# Patient Record
Sex: FEMALE
[De-identification: names, ages, dates, MRNs, and addresses within clinical notes are randomized per-mention and may not be internally consistent; named-entity substitution may affect disease eponyms.]

---

## 2021-02-04 ENCOUNTER — RESULT REVIEW (OUTPATIENT)
Age: 66
End: 2021-02-04

## 2021-02-05 ENCOUNTER — APPOINTMENT (OUTPATIENT)
Dept: OBGYN | Facility: CLINIC | Age: 66
End: 2021-02-05
Payer: COMMERCIAL

## 2021-02-05 PROCEDURE — 99072 ADDL SUPL MATRL&STAF TM PHE: CPT

## 2021-02-05 PROCEDURE — 99386 PREV VISIT NEW AGE 40-64: CPT

## 2021-10-21 PROBLEM — Z00.00 ENCOUNTER FOR PREVENTIVE HEALTH EXAMINATION: Status: ACTIVE | Noted: 2021-10-21

## 2022-10-24 ENCOUNTER — RESULT REVIEW (OUTPATIENT)
Age: 67
End: 2022-10-24

## 2022-10-31 ENCOUNTER — INPATIENT (INPATIENT)
Facility: HOSPITAL | Age: 67
LOS: 2 days | Discharge: HOME | DRG: 287 | End: 2022-11-03
Attending: INTERNAL MEDICINE | Admitting: INTERNAL MEDICINE
Payer: MEDICARE

## 2022-10-31 VITALS
TEMPERATURE: 98 F | RESPIRATION RATE: 20 BRPM | OXYGEN SATURATION: 100 % | WEIGHT: 117.07 LBS | HEIGHT: 63 IN | DIASTOLIC BLOOD PRESSURE: 68 MMHG | SYSTOLIC BLOOD PRESSURE: 108 MMHG | HEART RATE: 83 BPM

## 2022-10-31 DIAGNOSIS — R07.9 CHEST PAIN, UNSPECIFIED: ICD-10-CM

## 2022-10-31 LAB
ALBUMIN SERPL ELPH-MCNC: 3.7 G/DL — SIGNIFICANT CHANGE UP (ref 3.3–5)
ALBUMIN SERPL ELPH-MCNC: 4.2 G/DL — SIGNIFICANT CHANGE UP (ref 3.3–5)
ALP SERPL-CCNC: 65 U/L — SIGNIFICANT CHANGE UP (ref 40–120)
ALP SERPL-CCNC: 75 U/L — SIGNIFICANT CHANGE UP (ref 40–120)
ALT FLD-CCNC: 27 U/L — SIGNIFICANT CHANGE UP (ref 10–45)
ALT FLD-CCNC: 34 U/L — SIGNIFICANT CHANGE UP (ref 10–45)
ANION GAP SERPL CALC-SCNC: 11 MMOL/L — SIGNIFICANT CHANGE UP (ref 5–17)
APTT BLD: 30.1 SEC — SIGNIFICANT CHANGE UP (ref 27.5–35.5)
APTT BLD: 39.6 SEC — HIGH (ref 27.5–35.5)
AST SERPL-CCNC: 28 U/L — SIGNIFICANT CHANGE UP (ref 10–40)
AST SERPL-CCNC: 35 U/L — SIGNIFICANT CHANGE UP (ref 10–40)
BASE EXCESS BLDV CALC-SCNC: 3 MMOL/L — SIGNIFICANT CHANGE UP (ref -2–3)
BASOPHILS # BLD AUTO: 0.04 K/UL — SIGNIFICANT CHANGE UP (ref 0–0.2)
BASOPHILS NFR BLD AUTO: 0.5 % — SIGNIFICANT CHANGE UP (ref 0–2)
BILIRUB SERPL-MCNC: 0.3 MG/DL — SIGNIFICANT CHANGE UP (ref 0.2–1.2)
BILIRUB SERPL-MCNC: 0.4 MG/DL — SIGNIFICANT CHANGE UP (ref 0.2–1.2)
BUN SERPL-MCNC: 9 MG/DL — SIGNIFICANT CHANGE UP (ref 7–23)
CA-I SERPL-SCNC: 1.21 MMOL/L — SIGNIFICANT CHANGE UP (ref 1.15–1.33)
CALCIUM SERPL-MCNC: 8.8 MG/DL — SIGNIFICANT CHANGE UP (ref 8.4–10.5)
CALCIUM SERPL-MCNC: 9.2 MG/DL — SIGNIFICANT CHANGE UP (ref 8.4–10.5)
CHLORIDE BLDV-SCNC: 101 MMOL/L — SIGNIFICANT CHANGE UP (ref 96–108)
CHLORIDE SERPL-SCNC: 100 MMOL/L — SIGNIFICANT CHANGE UP (ref 96–108)
CHLORIDE SERPL-SCNC: 101 MMOL/L — SIGNIFICANT CHANGE UP (ref 96–108)
CO2 BLDV-SCNC: 31 MMOL/L — HIGH (ref 22–26)
CO2 SERPL-SCNC: 25 MMOL/L — SIGNIFICANT CHANGE UP (ref 22–31)
CO2 SERPL-SCNC: 26 MMOL/L — SIGNIFICANT CHANGE UP (ref 22–31)
CREAT SERPL-MCNC: 0.52 MG/DL — SIGNIFICANT CHANGE UP (ref 0.5–1.3)
CREAT SERPL-MCNC: 0.58 MG/DL — SIGNIFICANT CHANGE UP (ref 0.5–1.3)
CRP SERPL-MCNC: 168 MG/L — HIGH (ref 0–4)
EGFR: 100 ML/MIN/1.73M2 — SIGNIFICANT CHANGE UP
EGFR: 103 ML/MIN/1.73M2 — SIGNIFICANT CHANGE UP
EOSINOPHIL # BLD AUTO: 0.03 K/UL — SIGNIFICANT CHANGE UP (ref 0–0.5)
EOSINOPHIL NFR BLD AUTO: 0.4 % — SIGNIFICANT CHANGE UP (ref 0–6)
FLUAV AG NPH QL: SIGNIFICANT CHANGE UP
FLUBV AG NPH QL: SIGNIFICANT CHANGE UP
GAS PNL BLDV: 133 MMOL/L — LOW (ref 136–145)
GAS PNL BLDV: SIGNIFICANT CHANGE UP
GLUCOSE BLDV-MCNC: 93 MG/DL — SIGNIFICANT CHANGE UP (ref 70–99)
GLUCOSE SERPL-MCNC: 112 MG/DL — HIGH (ref 70–99)
GLUCOSE SERPL-MCNC: 97 MG/DL — SIGNIFICANT CHANGE UP (ref 70–99)
HCO3 BLDV-SCNC: 29 MMOL/L — SIGNIFICANT CHANGE UP (ref 22–29)
HCT VFR BLD CALC: 33 % — LOW (ref 34.5–45)
HCT VFR BLD CALC: 35.4 % — SIGNIFICANT CHANGE UP (ref 34.5–45)
HCT VFR BLDA CALC: 37 % — SIGNIFICANT CHANGE UP (ref 34.5–46.5)
HGB BLD CALC-MCNC: 12.3 G/DL — SIGNIFICANT CHANGE UP (ref 11.7–16.1)
HGB BLD-MCNC: 10.9 G/DL — LOW (ref 11.5–15.5)
HGB BLD-MCNC: 11.5 G/DL — SIGNIFICANT CHANGE UP (ref 11.5–15.5)
IMM GRANULOCYTES NFR BLD AUTO: 0.4 % — SIGNIFICANT CHANGE UP (ref 0–0.9)
INR BLD: 1.17 RATIO — HIGH (ref 0.88–1.16)
LACTATE BLDV-MCNC: 1.1 MMOL/L — SIGNIFICANT CHANGE UP (ref 0.5–2)
LYMPHOCYTES # BLD AUTO: 1.33 K/UL — SIGNIFICANT CHANGE UP (ref 1–3.3)
LYMPHOCYTES # BLD AUTO: 16.1 % — SIGNIFICANT CHANGE UP (ref 13–44)
MAGNESIUM SERPL-MCNC: 2.1 MG/DL — SIGNIFICANT CHANGE UP (ref 1.6–2.6)
MCHC RBC-ENTMCNC: 30 PG — SIGNIFICANT CHANGE UP (ref 27–34)
MCHC RBC-ENTMCNC: 30.4 PG — SIGNIFICANT CHANGE UP (ref 27–34)
MCHC RBC-ENTMCNC: 32.5 GM/DL — SIGNIFICANT CHANGE UP (ref 32–36)
MCHC RBC-ENTMCNC: 33 GM/DL — SIGNIFICANT CHANGE UP (ref 32–36)
MCV RBC AUTO: 91.9 FL — SIGNIFICANT CHANGE UP (ref 80–100)
MCV RBC AUTO: 92.4 FL — SIGNIFICANT CHANGE UP (ref 80–100)
MONOCYTES # BLD AUTO: 0.73 K/UL — SIGNIFICANT CHANGE UP (ref 0–0.9)
MONOCYTES NFR BLD AUTO: 8.9 % — SIGNIFICANT CHANGE UP (ref 2–14)
NEUTROPHILS # BLD AUTO: 6.08 K/UL — SIGNIFICANT CHANGE UP (ref 1.8–7.4)
NEUTROPHILS NFR BLD AUTO: 73.7 % — SIGNIFICANT CHANGE UP (ref 43–77)
NRBC # BLD: 0 /100 WBCS — SIGNIFICANT CHANGE UP (ref 0–0)
PCO2 BLDV: 52 MMHG — HIGH (ref 39–42)
PH BLDV: 7.36 — SIGNIFICANT CHANGE UP (ref 7.32–7.43)
PLATELET # BLD AUTO: 184 K/UL — SIGNIFICANT CHANGE UP (ref 150–400)
PLATELET # BLD AUTO: 191 K/UL — SIGNIFICANT CHANGE UP (ref 150–400)
PO2 BLDV: 27 MMHG — SIGNIFICANT CHANGE UP (ref 25–45)
POTASSIUM BLDV-SCNC: 3.5 MMOL/L — SIGNIFICANT CHANGE UP (ref 3.5–5.1)
POTASSIUM SERPL-MCNC: 3.6 MMOL/L — SIGNIFICANT CHANGE UP (ref 3.5–5.3)
POTASSIUM SERPL-SCNC: 3.6 MMOL/L — SIGNIFICANT CHANGE UP (ref 3.5–5.3)
PROT SERPL-MCNC: 6.3 G/DL — SIGNIFICANT CHANGE UP (ref 6–8.3)
PROT SERPL-MCNC: 7.1 G/DL — SIGNIFICANT CHANGE UP (ref 6–8.3)
PROTHROM AB SERPL-ACNC: 13.6 SEC — HIGH (ref 10.5–13.4)
RBC # BLD: 3.59 M/UL — LOW (ref 3.8–5.2)
RBC # BLD: 3.83 M/UL — SIGNIFICANT CHANGE UP (ref 3.8–5.2)
RBC # FLD: 11.9 % — SIGNIFICANT CHANGE UP (ref 10.3–14.5)
RBC # FLD: 12 % — SIGNIFICANT CHANGE UP (ref 10.3–14.5)
RSV RNA NPH QL NAA+NON-PROBE: SIGNIFICANT CHANGE UP
SAO2 % BLDV: 40 % — LOW (ref 67–88)
SARS-COV-2 RNA SPEC QL NAA+PROBE: SIGNIFICANT CHANGE UP
SODIUM SERPL-SCNC: 137 MMOL/L — SIGNIFICANT CHANGE UP (ref 135–145)
TROPONIN T, HIGH SENSITIVITY RESULT: 141 NG/L — HIGH (ref 0–51)
TROPONIN T, HIGH SENSITIVITY RESULT: 159 NG/L — HIGH (ref 0–51)
WBC # BLD: 6.02 K/UL — SIGNIFICANT CHANGE UP (ref 3.8–10.5)
WBC # BLD: 8.24 K/UL — SIGNIFICANT CHANGE UP (ref 3.8–10.5)
WBC # FLD AUTO: 6.02 K/UL — SIGNIFICANT CHANGE UP (ref 3.8–10.5)
WBC # FLD AUTO: 8.24 K/UL — SIGNIFICANT CHANGE UP (ref 3.8–10.5)

## 2022-10-31 PROCEDURE — 93308 TTE F-UP OR LMTD: CPT | Mod: 26

## 2022-10-31 PROCEDURE — 99291 CRITICAL CARE FIRST HOUR: CPT

## 2022-10-31 RX ORDER — ACETAMINOPHEN 500 MG
650 TABLET ORAL EVERY 6 HOURS
Refills: 0 | Status: DISCONTINUED | OUTPATIENT
Start: 2022-10-31 | End: 2022-11-03

## 2022-10-31 RX ORDER — HEPARIN SODIUM 5000 [USP'U]/ML
3200 INJECTION INTRAVENOUS; SUBCUTANEOUS ONCE
Refills: 0 | Status: COMPLETED | OUTPATIENT
Start: 2022-10-31 | End: 2022-10-31

## 2022-10-31 RX ORDER — HEPARIN SODIUM 5000 [USP'U]/ML
INJECTION INTRAVENOUS; SUBCUTANEOUS
Qty: 25000 | Refills: 0 | Status: DISCONTINUED | OUTPATIENT
Start: 2022-10-31 | End: 2022-11-01

## 2022-10-31 RX ORDER — HEPARIN SODIUM 5000 [USP'U]/ML
3200 INJECTION INTRAVENOUS; SUBCUTANEOUS EVERY 6 HOURS
Refills: 0 | Status: DISCONTINUED | OUTPATIENT
Start: 2022-10-31 | End: 2022-11-01

## 2022-10-31 RX ORDER — PANTOPRAZOLE SODIUM 20 MG/1
40 TABLET, DELAYED RELEASE ORAL
Refills: 0 | Status: DISCONTINUED | OUTPATIENT
Start: 2022-10-31 | End: 2022-11-03

## 2022-10-31 RX ORDER — ATORVASTATIN CALCIUM 80 MG/1
80 TABLET, FILM COATED ORAL AT BEDTIME
Refills: 0 | Status: DISCONTINUED | OUTPATIENT
Start: 2022-10-31 | End: 2022-11-02

## 2022-10-31 RX ORDER — POTASSIUM CHLORIDE 20 MEQ
40 PACKET (EA) ORAL ONCE
Refills: 0 | Status: COMPLETED | OUTPATIENT
Start: 2022-10-31 | End: 2022-10-31

## 2022-10-31 RX ORDER — ASPIRIN/CALCIUM CARB/MAGNESIUM 324 MG
81 TABLET ORAL DAILY
Refills: 0 | Status: DISCONTINUED | OUTPATIENT
Start: 2022-10-31 | End: 2022-11-02

## 2022-10-31 RX ORDER — TICAGRELOR 90 MG/1
180 TABLET ORAL ONCE
Refills: 0 | Status: COMPLETED | OUTPATIENT
Start: 2022-10-31 | End: 2022-10-31

## 2022-10-31 RX ORDER — METOPROLOL TARTRATE 50 MG
25 TABLET ORAL
Refills: 0 | Status: DISCONTINUED | OUTPATIENT
Start: 2022-10-31 | End: 2022-10-31

## 2022-10-31 RX ORDER — ASPIRIN/CALCIUM CARB/MAGNESIUM 324 MG
324 TABLET ORAL ONCE
Refills: 0 | Status: COMPLETED | OUTPATIENT
Start: 2022-10-31 | End: 2022-10-31

## 2022-10-31 RX ADMIN — TICAGRELOR 180 MILLIGRAM(S): 90 TABLET ORAL at 23:21

## 2022-10-31 RX ADMIN — HEPARIN SODIUM 750 UNIT(S)/HR: 5000 INJECTION INTRAVENOUS; SUBCUTANEOUS at 23:38

## 2022-10-31 RX ADMIN — Medication 40 MILLIEQUIVALENT(S): at 19:14

## 2022-10-31 RX ADMIN — ATORVASTATIN CALCIUM 80 MILLIGRAM(S): 80 TABLET, FILM COATED ORAL at 23:45

## 2022-10-31 RX ADMIN — Medication 650 MILLIGRAM(S): at 23:45

## 2022-10-31 RX ADMIN — HEPARIN SODIUM 3200 UNIT(S): 5000 INJECTION INTRAVENOUS; SUBCUTANEOUS at 17:28

## 2022-10-31 RX ADMIN — Medication 324 MILLIGRAM(S): at 15:11

## 2022-10-31 RX ADMIN — HEPARIN SODIUM 650 UNIT(S)/HR: 5000 INJECTION INTRAVENOUS; SUBCUTANEOUS at 17:31

## 2022-10-31 NOTE — H&P ADULT - NSHPPHYSICALEXAM_GEN_ALL_CORE
pt. seen and examined     Vital Signs Last 24 Hrs  T(C): 36.8 (01 Nov 2022 04:26), Max: 36.9 (31 Oct 2022 15:15)  T(F): 98.3 (01 Nov 2022 04:26), Max: 98.5 (31 Oct 2022 15:15)  HR: 76 (01 Nov 2022 04:26) (76 - 99)  BP: 101/63 (01 Nov 2022 04:26) (96/64 - 108/68)  BP(mean): 75 (01 Nov 2022 04:26) (72 - 75)  RR: 18 (01 Nov 2022 04:26) (16 - 20)  SpO2: 98% (01 Nov 2022 04:26) (98% - 100%)    Parameters below as of 01 Nov 2022 04:26  Patient On (Oxygen Delivery Method): room air    heent : nc/at   neck : supple, no JVD  lungs : B/L clear , no w/r/r   heart: s1s2 nml  abd : soft, NABS, NT/ND  ext : no e/c/c, pulses 2 +  neuro: aaox3 , no focal deficit

## 2022-10-31 NOTE — H&P ADULT - HISTORY OF PRESENT ILLNESS
This is a 67 year old with no past medical history who comes in with chest pain for 2 days. The pain started on Saturday and felt like pressure across her chest. The pain did not radiate and became worse with exertion. Taking a deep breath caused the pressure in her chest to increase. The pain was not dependent on position. She had not had any pain with exertion previously.   Her pain has been constant since Saturday but has reduced in severity since. She wwent to her primary care doctor who got an EG which showed new T wave inversions laterally and so he sent her into the ED.  She denies any LE edema, orthopnea PND.

## 2022-10-31 NOTE — ED ADULT NURSE NOTE - OBJECTIVE STATEMENT
Pt 66 y/o female, AxOX3, presents to ED from PCP office for EKG changes. Pt endorses not felling well x 2 weeks, made appt with PCP where EG was noted to be abnormal. Pt endorses intermittent chest pressure since having covid in october. Pt denies chest pressure at this time of assessment. EKG completed upon arrival. Pt is well appearing, speaking full sentences without difficulty. Breathing spontaneous and unlabored. Upon assessment, abdomen soft and nontender, +strong peripheral pulses, moving all extremities without difficulty, lungs clear. Pt placed on continuous pulse ox and cardiac monitor, NSR noted. Safety and comfort measures initiated- bed placed in lowest position and side rails raised. Pt oriented to call bell system.

## 2022-10-31 NOTE — ED PROVIDER NOTE - CLINICAL SUMMARY MEDICAL DECISION MAKING FREE TEXT BOX
67 year old F with no PMH presenting with chest pressure in setting of recent viral symptoms. VSS, well appearing. EKG with TWI II, III, AvF, V3-V6. Reported changed EKG from prior OP, referred to ED for this. Chest pressure is pleuritic, worse with exertion. No associated SOB. Concern for ACS vs NSTEMI vs pericarditis. Will get chest pain workup, cards consult if necessary, dispo pending findings. 67 year old F with no PMH presenting with chest pressure in setting of recent viral symptoms. VSS, well appearing. EKG with TWI II, III, AvF, V3-V6. Reported changed EKG from prior OP, referred to ED for this. Chest pressure is pleuritic, worse with exertion. No associated SOB. Concern for ACS vs NSTEMI vs pericarditis. Will get chest pain workup, cards consult if necessary, dispo pending findings.  Attending Lorena Tejada: 68yo female presenting with chest pain a couple days ago. upon arrival pt hemodynamically stable. ekg with evidence of st changes and t wave inversions. blood work sent showing elevated troponin. pt chest pain free. pocus with apical hypokinesis. consider MI, vs takatsubo. cath consult placed and pt started on heparin gtt

## 2022-10-31 NOTE — H&P ADULT - NSHPLABSRESULTS_GEN_ALL_CORE
10.9   6.02  )-----------( 184      ( 31 Oct 2022 23:24 )             33.0   10-31    137  |  101  |  9   ----------------------------<  112<H>  3.6   |  25  |  0.52    Ca    8.8      31 Oct 2022 18:41  Mg     2.1     10-31    TPro  6.3  /  Alb  3.7  /  TBili  0.3  /  DBili  x   /  AST  28  /  ALT  27  /  AlkPhos  65  10-31

## 2022-10-31 NOTE — H&P ADULT - ASSESSMENT
A/P     Chest pain / NSTEMI  -CE pos   -seen by cardio   -started on heparin GTT   brilinta loading   -hold off on BBlocker 2/2 borderline BP and HR being controlled   echo     HTLD  -started on lipitor 80 mg q HS     dispo: scheduled for cardiac cath in am     advance care planning : d/w patinet regarding advance directive, d/w her regarding intubation / CPR if the need arise. She agrees for everything. remain full code. time spend 15 min

## 2022-10-31 NOTE — CONSULT NOTE ADULT - ATTENDING COMMENTS
Chest pain in the setting of elevated cardiac enzymes, abnormal ECG (lateral TWI), normal luminogram (tortuous LAD), and apical WMA. Differential includes stress-induced CM, MINOCA, and less likely myopericarditis (based on location of WMA).    Recommend:  Start metoprolol tartrate 12.5 mg tid  CMR

## 2022-10-31 NOTE — ED PROVIDER NOTE - OBJECTIVE STATEMENT
Attending Lorena Tejada: 68 yo female without sig PMH presenting with concern for chest pain. a couple of days ago pt had some abdominal discomfort with associated n/v. developed discomfort in the middle of her chest. pain worsens with taking deep breaths. went to see her doctor today and did an ekg which was changed since her prior and sent to the ed. no h/o CAD. no fevers. no black or bloody stools.

## 2022-10-31 NOTE — ED ADULT NURSE REASSESSMENT NOTE - NS ED NURSE REASSESS COMMENT FT1
Pt observed sitting up in stretcher conversing with RN without difficulty. Breathing spontaneous and unlabored. Pt updated on plan of care awaiting bed assignment, RTM tele. Maintained on cont. cardiac monitor, NSR. Denies current chest pain since arrival to ED. Heparin infusing as per MD order.  No acute distress noted. Call bell within reach.

## 2022-10-31 NOTE — CONSULT NOTE ADULT - SUBJECTIVE AND OBJECTIVE BOX
Patient seen and evaluated at bedside    Chief Complaint:    HPI:      PMHx:   No pertinent past medical history        PSHx:       Allergies:  penicillin (Rash)      Home Meds:    Current Medications:   heparin   Injectable 3200 Unit(s) IV Push every 6 hours PRN  heparin  Infusion.  Unit(s)/Hr IV Continuous <Continuous>      FAMILY HISTORY:      Social History:  Smoking History:  Alcohol Use:  Drug Use:    REVIEW OF SYSTEMS:  Constitutional:     [ ] negative [ ] fevers [ ] chills [ ] weight loss [ ] weight gain  HEENT:                  [ ] negative [ ] dry eyes [ ] eye irritation [ ] postnasal drip [ ] nasal congestion  CV:                         [ ] negative  [ ] chest pain [ ] orthopnea [ ] palpitations [ ] murmur  Resp:                     [ ] negative [ ] cough [ ] shortness of breath [ ] dyspnea [ ] wheezing [ ] sputum [ ]hemoptysis  GI:                          [ ] negative [ ] nausea [ ] vomiting [ ] diarrhea [ ] constipation [ ] abd pain [ ] dysphagia   :                        [ ] negative [ ] dysuria [ ] nocturia [ ] hematuria [ ] increased urinary frequency  Musculoskeletal: [ ] negative [ ] back pain [ ] myalgias [ ] arthralgias [ ] fracture  Skin:                       [ ] negative [ ] rash [ ] itch  Neurological:        [ ] negative [ ] headache [ ] dizziness [ ] syncope [ ] weakness [ ] numbness  Psychiatric:           [ ] negative [ ] anxiety [ ] depression  Endocrine:            [ ] negative [ ] diabetes [ ] thyroid problem  Heme/Lymph:      [ ] negative [ ] anemia [ ] bleeding problem  Allergic/Immune: [ ] negative [ ] itchy eyes [ ] nasal discharge [ ] hives [ ] angioedema    [ ] All other systems negative  [ ] Unable to assess ROS due to      Physical Exam:  T(F): 98.5 (10-31), Max: 98.5 (10-31)  HR: 81 (10-31) (81 - 83)  BP: 99/64 (10-31) (99/64 - 108/68)  RR: 18 (10-31)  SpO2: 98% (10-31)  GENERAL: No acute distress, well-developed  HEAD:  Atraumatic, Normocephalic  ENT: EOMI, PERRLA, conjunctiva and sclera clear, Neck supple, No JVD, moist mucosa  CHEST/LUNG: Clear to auscultation bilaterally; No wheeze, equal breath sounds bilaterally   BACK: No spinal tenderness  HEART: Regular rate and rhythm; No murmurs, rubs, or gallops  ABDOMEN: Soft, Nontender, Nondistended; Bowel sounds present  EXTREMITIES:  No clubbing, cyanosis, or edema  PSYCH: Nl behavior, nl affect  NEUROLOGY: AAOx3, non-focal, cranial nerves intact  SKIN: Normal color, No rashes or lesions  LINES:    Cardiovascular Diagnostic Testing:    ECG: Personally reviewed:    Echo: Personally reviewed:    Stress Testing:    Cath:    Imaging:    CXR: Personally reviewed    Labs: Personally reviewed                        11.5   8.24  )-----------( 191      ( 31 Oct 2022 15:24 )             35.4     10-31    137  |  100  |  9   ----------------------------<  97  3.6   |  26  |  0.58    Ca    9.2      31 Oct 2022 15:24    TPro  7.1  /  Alb  4.2  /  TBili  0.4  /  DBili  x   /  AST  35  /  ALT  34  /  AlkPhos  75  10-31    PT/INR - ( 31 Oct 2022 15:24 )   PT: 13.6 sec;   INR: 1.17 ratio         PTT - ( 31 Oct 2022 15:24 )  PTT:30.1 sec            CARDIAC MARKERS ( 31 Oct 2022 15:24 )  141 ng/L / x     / x     / x     / x     / x          penicillin (Rash)    heparin   Injectable 3200 Unit(s) IV Push every 6 hours PRN  heparin  Infusion.  Unit(s)/Hr IV Continuous <Continuous>    T(F): 98.5 (10-31), Max: 98.5 (10-31)  HR: 81 (10-31) (81 - 83)  BP: 99/64 (10-31) (99/64 - 108/68)  RR: 18 (10-31)  SpO2: 98% (10-31) Patient seen and evaluated at bedside    Chief Complaint: Chest pain    HPI: This is a 67 year old with no past medical history who comes in with chest pain for 2 days. The pain started on Saturday and felt like pressure across her chest.       PMHx:   No pertinent past medical history        PSHx:       Allergies:  penicillin (Rash)      Home Meds:    Current Medications:   heparin   Injectable 3200 Unit(s) IV Push every 6 hours PRN  heparin  Infusion.  Unit(s)/Hr IV Continuous <Continuous>      FAMILY HISTORY:      Social History:  Smoking History:  Alcohol Use:  Drug Use:    REVIEW OF SYSTEMS:  Constitutional:     [ ] negative [ ] fevers [ ] chills [ ] weight loss [ ] weight gain  HEENT:                  [ ] negative [ ] dry eyes [ ] eye irritation [ ] postnasal drip [ ] nasal congestion  CV:                         [ ] negative  [ ] chest pain [ ] orthopnea [ ] palpitations [ ] murmur  Resp:                     [ ] negative [ ] cough [ ] shortness of breath [ ] dyspnea [ ] wheezing [ ] sputum [ ]hemoptysis  GI:                          [ ] negative [ ] nausea [ ] vomiting [ ] diarrhea [ ] constipation [ ] abd pain [ ] dysphagia   :                        [ ] negative [ ] dysuria [ ] nocturia [ ] hematuria [ ] increased urinary frequency  Musculoskeletal: [ ] negative [ ] back pain [ ] myalgias [ ] arthralgias [ ] fracture  Skin:                       [ ] negative [ ] rash [ ] itch  Neurological:        [ ] negative [ ] headache [ ] dizziness [ ] syncope [ ] weakness [ ] numbness  Psychiatric:           [ ] negative [ ] anxiety [ ] depression  Endocrine:            [ ] negative [ ] diabetes [ ] thyroid problem  Heme/Lymph:      [ ] negative [ ] anemia [ ] bleeding problem  Allergic/Immune: [ ] negative [ ] itchy eyes [ ] nasal discharge [ ] hives [ ] angioedema    [ ] All other systems negative  [ ] Unable to assess ROS due to      Physical Exam:  T(F): 98.5 (10-31), Max: 98.5 (10-31)  HR: 81 (10-31) (81 - 83)  BP: 99/64 (10-31) (99/64 - 108/68)  RR: 18 (10-31)  SpO2: 98% (10-31)  GENERAL: No acute distress, well-developed  HEAD:  Atraumatic, Normocephalic  ENT: EOMI, PERRLA, conjunctiva and sclera clear, Neck supple, No JVD, moist mucosa  CHEST/LUNG: Clear to auscultation bilaterally; No wheeze, equal breath sounds bilaterally   BACK: No spinal tenderness  HEART: Regular rate and rhythm; No murmurs, rubs, or gallops  ABDOMEN: Soft, Nontender, Nondistended; Bowel sounds present  EXTREMITIES:  No clubbing, cyanosis, or edema  PSYCH: Nl behavior, nl affect  NEUROLOGY: AAOx3, non-focal, cranial nerves intact  SKIN: Normal color, No rashes or lesions  LINES:    Cardiovascular Diagnostic Testing:    ECG: Personally reviewed:    Echo: Personally reviewed:    Stress Testing:    Cath:    Imaging:    CXR: Personally reviewed    Labs: Personally reviewed                        11.5   8.24  )-----------( 191      ( 31 Oct 2022 15:24 )             35.4     10-31    137  |  100  |  9   ----------------------------<  97  3.6   |  26  |  0.58    Ca    9.2      31 Oct 2022 15:24    TPro  7.1  /  Alb  4.2  /  TBili  0.4  /  DBili  x   /  AST  35  /  ALT  34  /  AlkPhos  75  10-31    PT/INR - ( 31 Oct 2022 15:24 )   PT: 13.6 sec;   INR: 1.17 ratio         PTT - ( 31 Oct 2022 15:24 )  PTT:30.1 sec            CARDIAC MARKERS ( 31 Oct 2022 15:24 )  141 ng/L / x     / x     / x     / x     / x          penicillin (Rash)    heparin   Injectable 3200 Unit(s) IV Push every 6 hours PRN  heparin  Infusion.  Unit(s)/Hr IV Continuous <Continuous>    T(F): 98.5 (10-31), Max: 98.5 (10-31)  HR: 81 (10-31) (81 - 83)  BP: 99/64 (10-31) (99/64 - 108/68)  RR: 18 (10-31)  SpO2: 98% (10-31) Patient seen and evaluated at bedside    Chief Complaint: Chest pain    HPI: This is a 67 year old with no past medical history who comes in with chest pain for 2 days. The pain started on Saturday and felt like pressure across her chest. The pain did not radiate and became worse with exertion. Taking a deep breath caused the pressure in her chest to increase. The pain was not dependent on position. She had not had any pain with exertion previously.   Her pain has been constant since Saturday but has reduced in severity since. She wwent to her primary care doctor who got an EG which showed new T wave inversions laterally and so he sent her into the ED.  She denies any LE edema, orthopnea PND.   She has not had any sick contacts, cough, SOB, urinary symptoms.       PMHx:   No pertinent past medical history        PSHx:       Allergies:  penicillin (Rash)      Home Meds: None    Current Medications:   heparin   Injectable 3200 Unit(s) IV Push every 6 hours PRN  heparin  Infusion.  Unit(s)/Hr IV Continuous <Continuous>      FAMILY HISTORY:      Social History:  Never smoker, drinks socially, no drugs    REVIEW OF SYSTEMS:  Constitutional:     [ ] negative [ ] fevers [ ] chills [ ] weight loss [ ] weight gain  HEENT:                  [ ] negative [ ] dry eyes [ ] eye irritation [ ] postnasal drip [ ] nasal congestion  CV:                         [ ] negative  [ ] chest pain [ ] orthopnea [ ] palpitations [ ] murmur  Resp:                     [ ] negative [ ] cough [ ] shortness of breath [ ] dyspnea [ ] wheezing [ ] sputum [ ]hemoptysis  GI:                          [ ] negative [ ] nausea [ ] vomiting [ ] diarrhea [ ] constipation [ ] abd pain [ ] dysphagia   :                        [ ] negative [ ] dysuria [ ] nocturia [ ] hematuria [ ] increased urinary frequency  Musculoskeletal: [ ] negative [ ] back pain [ ] myalgias [ ] arthralgias [ ] fracture  Skin:                       [ ] negative [ ] rash [ ] itch  Neurological:        [ ] negative [ ] headache [ ] dizziness [ ] syncope [ ] weakness [ ] numbness  Psychiatric:           [ ] negative [ ] anxiety [ ] depression  Endocrine:            [ ] negative [ ] diabetes [ ] thyroid problem  Heme/Lymph:      [ ] negative [ ] anemia [ ] bleeding problem  Allergic/Immune: [ ] negative [ ] itchy eyes [ ] nasal discharge [ ] hives [ ] angioedema    [ ] All other systems negative  [ ] Unable to assess ROS due to      Physical Exam:  T(F): 98.5 (10-31), Max: 98.5 (10-31)  HR: 81 (10-31) (81 - 83)  BP: 99/64 (10-31) (99/64 - 108/68)  RR: 18 (10-31)  SpO2: 98% (10-31)  GENERAL: No acute distress, well-developed  HEAD:  Atraumatic, Normocephalic  ENT: EOMI, PERRLA, conjunctiva and sclera clear, Neck supple, No JVD, moist mucosa  CHEST/LUNG: Clear to auscultation bilaterally; No wheeze, equal breath sounds bilaterally   BACK: No spinal tenderness  HEART: Regular rate and rhythm; No murmurs, rubs, or gallops  ABDOMEN: Soft, Nontender, Nondistended; Bowel sounds present  EXTREMITIES:  No clubbing, cyanosis, or edema    Cardiovascular Diagnostic Testing:    ECG: Personally reviewed: normal sinus with rate of 80, no ST segment changes with T wasve inversions in the lateral leads.    Imaging:    CXR: Personally reviewed    Labs: Personally reviewed                        11.5   8.24  )-----------( 191      ( 31 Oct 2022 15:24 )             35.4     10-31    137  |  100  |  9   ----------------------------<  97  3.6   |  26  |  0.58    Ca    9.2      31 Oct 2022 15:24    TPro  7.1  /  Alb  4.2  /  TBili  0.4  /  DBili  x   /  AST  35  /  ALT  34  /  AlkPhos  75  10-31    PT/INR - ( 31 Oct 2022 15:24 )   PT: 13.6 sec;   INR: 1.17 ratio         PTT - ( 31 Oct 2022 15:24 )  PTT:30.1 sec            CARDIAC MARKERS ( 31 Oct 2022 15:24 )  141 ng/L / x     / x     / x     / x     / x          penicillin (Rash)    heparin   Injectable 3200 Unit(s) IV Push every 6 hours PRN  heparin  Infusion.  Unit(s)/Hr IV Continuous <Continuous>    T(F): 98.5 (10-31), Max: 98.5 (10-31)  HR: 81 (10-31) (81 - 83)  BP: 99/64 (10-31) (99/64 - 108/68)  RR: 18 (10-31)  SpO2: 98% (10-31)

## 2022-10-31 NOTE — ED PROVIDER NOTE - ATTENDING CONTRIBUTION TO CARE
Attending MD Lorena Tejada:  I personally have seen and examined this patient.  Resident note reviewed and agree on plan of care and except where noted.  See HPI, PE, and MDM for details.

## 2022-10-31 NOTE — ED ADULT NURSE NOTE - NSIMPLEMENTINTERV_GEN_ALL_ED
Implemented All Universal Safety Interventions:  [place] to call system. Call bell, personal items and telephone within reach. Instruct patient to call for assistance. Room bathroom lighting operational. Non-slip footwear when patient is off stretcher. Physically safe environment: no spills, clutter or unnecessary equipment. Stretcher in lowest position, wheels locked, appropriate side rails in place.

## 2022-10-31 NOTE — ED PROVIDER NOTE - PROGRESS NOTE DETAILS
Ynes Esteban PGY-3: Troponin 114 with EKG changes, TWI V3-V6, II, III, AvF Ynes Esteban PGY-3: Patient's cardiologist Dr. Larson

## 2022-10-31 NOTE — ED PROVIDER NOTE - NS ED ROS FT
CONST: no fevers, no chills  EYES: no pain, no vision changes  ENT: no sore throat, no ear pain, no change in hearing  CV: +chest pressure, no leg swelling  RESP: no shortness of breath, + cough  ABD: no abdominal pain, no nausea, + vomiting, + diarrhea  : no dysuria, no flank pain, no hematuria  MSK: no back pain, no extremity pain  NEURO: no headache or additional neurologic complaints  HEME: no easy bleeding  SKIN:  no rash

## 2022-10-31 NOTE — CONSULT NOTE ADULT - ASSESSMENT
This is a 67 year old with no past medical history who comes in with chest pain for 2 days and with new T wave inversions and an elevted troponin.    #NSTEMI  -Load the patient with ASA and Brilinta  -Start the patient  on heparin drip for ACS protocol  -Please trend enzymes until downtrend or peak  -Please obtain echo  -Keep NPO after midnight for potential cath in the AM  -Please obtain COVID swab and pre-op labs     Lynda Guaman MD  Cardiology fellow This is a 67 year old with no past medical history who comes in with chest pain for 2 days and with new T wave inversions and an elevted troponin.    #NSTEMI  -Load the patient with ASA and Brilinta  -Start the patient  on heparin drip for ACS protocol  -Please start the patient on atorvastatin 80mg  -Please trend enzymes until downtrend or peak  -Please obtain echo  -Keep NPO after midnight for potential cath in the AM  -Please obtain COVID swab and pre-op labs     Lynda Guaman MD  Cardiology fellow

## 2022-10-31 NOTE — ED PROVIDER NOTE - PHYSICAL EXAMINATION
GENERAL: Awake, alert, NAD  HEENT: NC/AT, moist mucous membranes, PERRL, EOMI  LUNGS: CTAB, no wheezes or crackles   CARDIAC: RRR, grade II systolic murmur, S3  ABDOMEN: Soft, normal BS, non tender, non distended, no rebound, no guarding  BACK: No midline spinal tenderness, no CVA tenderness  EXT: No edema, no calf tenderness, 2+ DP pulses bilaterally, no deformities.  NEURO: A&Ox3. Moving all extremities.  SKIN: Warm and dry. No rash.  PSYCH: Normal affect. GENERAL: Awake, alert, NAD  HEENT: NC/AT, moist mucous membranes, PERRL, EOMI  LUNGS: CTAB, no wheezes or crackles   CARDIAC: RRR, grade II systolic murmur, S3  ABDOMEN: Soft, normal BS, non tender, non distended, no rebound, no guarding  BACK: No midline spinal tenderness, no CVA tenderness  EXT: No edema, no calf tenderness, 2+ DP pulses bilaterally, no deformities.  NEURO: A&Ox3. Moving all extremities.  SKIN: Warm and dry. No rash.  PSYCH: Normal affect.  Attending Lorena Tejada Gen: NAD, heent: atrauamtic, eomi, perrla, mmm, op pink, uvula midline, neck; nttp, no nuchal rigidity, chest: nttp, no crepitus, cv: rrr, +murmur, lungs: ctab, abd: soft, nontender, nondistended, no peritoneal signs,no guarding, ext: wwp, neg homans, skin: no rash, neuro: awake and alert, following commands, speech clear, sensation and strength intact, no focal deficits

## 2022-11-01 ENCOUNTER — TRANSCRIPTION ENCOUNTER (OUTPATIENT)
Age: 67
End: 2022-11-01

## 2022-11-01 LAB
ANION GAP SERPL CALC-SCNC: 11 MMOL/L — SIGNIFICANT CHANGE UP (ref 5–17)
APTT BLD: 40.6 SEC — HIGH (ref 27.5–35.5)
BUN SERPL-MCNC: 11 MG/DL — SIGNIFICANT CHANGE UP (ref 7–23)
CALCIUM SERPL-MCNC: 8.8 MG/DL — SIGNIFICANT CHANGE UP (ref 8.4–10.5)
CHLORIDE SERPL-SCNC: 105 MMOL/L — SIGNIFICANT CHANGE UP (ref 96–108)
CO2 SERPL-SCNC: 23 MMOL/L — SIGNIFICANT CHANGE UP (ref 22–31)
CREAT SERPL-MCNC: 0.55 MG/DL — SIGNIFICANT CHANGE UP (ref 0.5–1.3)
CRP SERPL HS-MCNC: 108.38 MG/L — HIGH (ref 0–3)
EGFR: 102 ML/MIN/1.73M2 — SIGNIFICANT CHANGE UP
GLUCOSE SERPL-MCNC: 97 MG/DL — SIGNIFICANT CHANGE UP (ref 70–99)
HCT VFR BLD CALC: 34 % — LOW (ref 34.5–45)
HCV AB S/CO SERPL IA: 0.17 S/CO — SIGNIFICANT CHANGE UP (ref 0–0.99)
HCV AB SERPL-IMP: SIGNIFICANT CHANGE UP
HGB BLD-MCNC: 11.4 G/DL — LOW (ref 11.5–15.5)
MAGNESIUM SERPL-MCNC: 2 MG/DL — SIGNIFICANT CHANGE UP (ref 1.6–2.6)
MCHC RBC-ENTMCNC: 30.2 PG — SIGNIFICANT CHANGE UP (ref 27–34)
MCHC RBC-ENTMCNC: 33.5 GM/DL — SIGNIFICANT CHANGE UP (ref 32–36)
MCV RBC AUTO: 90.2 FL — SIGNIFICANT CHANGE UP (ref 80–100)
NRBC # BLD: 0 /100 WBCS — SIGNIFICANT CHANGE UP (ref 0–0)
PLATELET # BLD AUTO: 187 K/UL — SIGNIFICANT CHANGE UP (ref 150–400)
POTASSIUM SERPL-MCNC: 4 MMOL/L — SIGNIFICANT CHANGE UP (ref 3.5–5.3)
POTASSIUM SERPL-SCNC: 4 MMOL/L — SIGNIFICANT CHANGE UP (ref 3.5–5.3)
RBC # BLD: 3.77 M/UL — LOW (ref 3.8–5.2)
RBC # FLD: 11.9 % — SIGNIFICANT CHANGE UP (ref 10.3–14.5)
SODIUM SERPL-SCNC: 139 MMOL/L — SIGNIFICANT CHANGE UP (ref 135–145)
TROPONIN T, HIGH SENSITIVITY RESULT: 114 NG/L — HIGH (ref 0–51)
TROPONIN T, HIGH SENSITIVITY RESULT: 129 NG/L — HIGH (ref 0–51)
WBC # BLD: 6.33 K/UL — SIGNIFICANT CHANGE UP (ref 3.8–10.5)
WBC # FLD AUTO: 6.33 K/UL — SIGNIFICANT CHANGE UP (ref 3.8–10.5)

## 2022-11-01 PROCEDURE — 93306 TTE W/DOPPLER COMPLETE: CPT | Mod: 26

## 2022-11-01 PROCEDURE — 93010 ELECTROCARDIOGRAM REPORT: CPT

## 2022-11-01 PROCEDURE — 93458 L HRT ARTERY/VENTRICLE ANGIO: CPT | Mod: 26

## 2022-11-01 PROCEDURE — 99152 MOD SED SAME PHYS/QHP 5/>YRS: CPT

## 2022-11-01 RX ORDER — SODIUM CHLORIDE 9 MG/ML
250 INJECTION INTRAMUSCULAR; INTRAVENOUS; SUBCUTANEOUS ONCE
Refills: 0 | Status: COMPLETED | OUTPATIENT
Start: 2022-11-01 | End: 2022-11-01

## 2022-11-01 RX ORDER — METOPROLOL TARTRATE 50 MG
12.5 TABLET ORAL
Refills: 0 | Status: DISCONTINUED | OUTPATIENT
Start: 2022-11-01 | End: 2022-11-02

## 2022-11-01 RX ORDER — SODIUM CHLORIDE 9 MG/ML
250 INJECTION INTRAMUSCULAR; INTRAVENOUS; SUBCUTANEOUS ONCE
Refills: 0 | Status: DISCONTINUED | OUTPATIENT
Start: 2022-11-01 | End: 2022-11-03

## 2022-11-01 RX ADMIN — PANTOPRAZOLE SODIUM 40 MILLIGRAM(S): 20 TABLET, DELAYED RELEASE ORAL at 05:30

## 2022-11-01 RX ADMIN — SODIUM CHLORIDE 750 MILLILITER(S): 9 INJECTION INTRAMUSCULAR; INTRAVENOUS; SUBCUTANEOUS at 09:45

## 2022-11-01 RX ADMIN — Medication 650 MILLIGRAM(S): at 15:35

## 2022-11-01 RX ADMIN — ATORVASTATIN CALCIUM 80 MILLIGRAM(S): 80 TABLET, FILM COATED ORAL at 19:56

## 2022-11-01 RX ADMIN — HEPARIN SODIUM 850 UNIT(S)/HR: 5000 INJECTION INTRAVENOUS; SUBCUTANEOUS at 06:31

## 2022-11-01 RX ADMIN — Medication 650 MILLIGRAM(S): at 16:05

## 2022-11-01 RX ADMIN — Medication 81 MILLIGRAM(S): at 05:31

## 2022-11-01 RX ADMIN — Medication 650 MILLIGRAM(S): at 00:05

## 2022-11-01 NOTE — PROGRESS NOTE ADULT - SUBJECTIVE AND OBJECTIVE BOX
Patient is a 67y old  Female who presents with a chief complaint of chest pain / NSTEMI (31 Oct 2022 18:20)      INTERVAL HPI/OVERNIGHT EVENTS:  T(C): 36.6 (11-01-22 @ 17:37), Max: 36.8 (11-01-22 @ 04:26)  HR: 78 (11-01-22 @ 19:58) (70 - 101)  BP: 97/63 (11-01-22 @ 19:58) (87/62 - 114/73)  RR: 18 (11-01-22 @ 19:58) (16 - 20)  SpO2: 99% (11-01-22 @ 19:58) (97% - 100%)  Wt(kg): --  I&O's Summary      PAST MEDICAL & SURGICAL HISTORY:  No pertinent past medical history      No significant past surgical history          SOCIAL HISTORY  Alcohol:  Tobacco:  Illicit substance use:    FAMILY HISTORY:    REVIEW OF SYSTEMS:  CONSTITUTIONAL: No fever, weight loss, or fatigue  EYES: No eye pain, visual disturbances, or discharge  ENMT:  No difficulty hearing, tinnitus, vertigo; No sinus or throat pain  NECK: No pain or stiffness  RESPIRATORY: No cough, wheezing, chills or hemoptysis; No shortness of breath  CARDIOVASCULAR: No chest pain, palpitations, dizziness, or leg swelling  GASTROINTESTINAL: No abdominal or epigastric pain. No nausea, vomiting, or hematemesis; No diarrhea or constipation. No melena or hematochezia.  GENITOURINARY: No dysuria, frequency, hematuria, or incontinence  NEUROLOGICAL: No headaches, memory loss, loss of strength, numbness, or tremors  SKIN: No itching, burning, rashes, or lesions   LYMPH NODES: No enlarged glands  ENDOCRINE: No heat or cold intolerance; No hair loss  MUSCULOSKELETAL: No joint pain or swelling; No muscle, back, or extremity pain  PSYCHIATRIC: No depression, anxiety, mood swings, or difficulty sleeping  HEME/LYMPH: No easy bruising, or bleeding gums  ALLERY AND IMMUNOLOGIC: No hives or eczema    RADIOLOGY & ADDITIONAL TESTS:    Imaging Personally Reviewed:  [ ] YES  [ ] NO    Consultant(s) Notes Reviewed:  [ ] YES  [ ] NO    PHYSICAL EXAM:  GENERAL: NAD, well-groomed, well-developed  HEAD:  Atraumatic, Normocephalic  EYES: EOMI, PERRLA, conjunctiva and sclera clear  ENMT: No tonsillar erythema, exudates, or enlargement; Moist mucous membranes, Good dentition, No lesions  NECK: Supple, No JVD, Normal thyroid  NERVOUS SYSTEM:  Alert & Oriented X3, Good concentration; Motor Strength 5/5 B/L upper and lower extremities; DTRs 2+ intact and symmetric  CHEST/LUNG: Clear to percussion bilaterally; No rales, rhonchi, wheezing, or rubs  HEART: Regular rate and rhythm; No murmurs, rubs, or gallops  ABDOMEN: Soft, Nontender, Nondistended; Bowel sounds present  EXTREMITIES:  2+ Peripheral Pulses, No clubbing, cyanosis, or edema  LYMPH: No lymphadenopathy noted  SKIN: No rashes or lesions    LABS:                        11.4   6.33  )-----------( 187      ( 01 Nov 2022 05:49 )             34.0     11-01    139  |  105  |  11  ----------------------------<  97  4.0   |  23  |  0.55    Ca    8.8      01 Nov 2022 05:50  Mg     2.0     11-01    TPro  6.3  /  Alb  3.7  /  TBili  0.3  /  DBili  x   /  AST  28  /  ALT  27  /  AlkPhos  65  10-31    PT/INR - ( 31 Oct 2022 15:24 )   PT: 13.6 sec;   INR: 1.17 ratio         PTT - ( 01 Nov 2022 05:49 )  PTT:40.6 sec    CAPILLARY BLOOD GLUCOSE                MEDICATIONS  (STANDING):  aspirin enteric coated 81 milliGRAM(s) Oral daily  atorvastatin 80 milliGRAM(s) Oral at bedtime  metoprolol tartrate 12.5 milliGRAM(s) Oral two times a day  pantoprazole    Tablet 40 milliGRAM(s) Oral before breakfast  sodium chloride 0.9% Bolus 250 milliLiter(s) IV Bolus once    MEDICATIONS  (PRN):  acetaminophen     Tablet .. 650 milliGRAM(s) Oral every 6 hours PRN Temp greater or equal to 38C (100.4F), Mild Pain (1 - 3)      Care Discussed with Consultants/Other Providers [ ] YES  [ ] NO Patient is a 67y old  Female who presents with a chief complaint of chest pain / NSTEMI (31 Oct 2022 18:20)      INTERVAL HPI/OVERNIGHT EVENTS: s/p cardiac cath : wnl   T(C): 36.6 (11-01-22 @ 17:37), Max: 36.8 (11-01-22 @ 04:26)  HR: 78 (11-01-22 @ 19:58) (70 - 101)  BP: 97/63 (11-01-22 @ 19:58) (87/62 - 114/73)  RR: 18 (11-01-22 @ 19:58) (16 - 20)  SpO2: 99% (11-01-22 @ 19:58) (97% - 100%)  Wt(kg): --  I&O's Summary      PAST MEDICAL & SURGICAL HISTORY:  No pertinent past medical history      No significant past surgical history          SOCIAL HISTORY  Alcohol:  Tobacco:  Illicit substance use:    FAMILY HISTORY:    REVIEW OF SYSTEMS:  CONSTITUTIONAL: No fever, weight loss, or fatigue  EYES: No eye pain, visual disturbances, or discharge  ENMT:  No difficulty hearing, tinnitus, vertigo; No sinus or throat pain  NECK: No pain or stiffness  RESPIRATORY: No cough, wheezing, chills or hemoptysis; No shortness of breath  CARDIOVASCULAR: No chest pain, palpitations, dizziness, or leg swelling  GASTROINTESTINAL: No abdominal or epigastric pain. No nausea, vomiting, or hematemesis; No diarrhea or constipation. No melena or hematochezia.  GENITOURINARY: No dysuria, frequency, hematuria, or incontinence  NEUROLOGICAL: No headaches, memory loss, loss of strength, numbness, or tremors  SKIN: No itching, burning, rashes, or lesions   LYMPH NODES: No enlarged glands  ENDOCRINE: No heat or cold intolerance; No hair loss  MUSCULOSKELETAL: No joint pain or swelling; No muscle, back, or extremity pain  PSYCHIATRIC: No depression, anxiety, mood swings, or difficulty sleeping  HEME/LYMPH: No easy bruising, or bleeding gums  ALLERY AND IMMUNOLOGIC: No hives or eczema    RADIOLOGY & ADDITIONAL TESTS:    Imaging Personally Reviewed:  [ ] YES  [ ] NO    Consultant(s) Notes Reviewed:  [ ] YES  [ ] NO    PHYSICAL EXAM:  GENERAL: NAD, well-groomed, well-developed  HEAD:  Atraumatic, Normocephalic  EYES: EOMI, PERRLA, conjunctiva and sclera clear  ENMT: No tonsillar erythema, exudates, or enlargement; Moist mucous membranes, Good dentition, No lesions  NECK: Supple, No JVD, Normal thyroid  NERVOUS SYSTEM:  Alert & Oriented X3, Good concentration; Motor Strength 5/5 B/L upper and lower extremities; DTRs 2+ intact and symmetric  CHEST/LUNG: Clear to percussion bilaterally; No rales, rhonchi, wheezing, or rubs  HEART: Regular rate and rhythm; No murmurs, rubs, or gallops  ABDOMEN: Soft, Nontender, Nondistended; Bowel sounds present  EXTREMITIES:  2+ Peripheral Pulses, No clubbing, cyanosis, or edema  LYMPH: No lymphadenopathy noted  SKIN: No rashes or lesions    LABS:                        11.4   6.33  )-----------( 187      ( 01 Nov 2022 05:49 )             34.0     11-01    139  |  105  |  11  ----------------------------<  97  4.0   |  23  |  0.55    Ca    8.8      01 Nov 2022 05:50  Mg     2.0     11-01    TPro  6.3  /  Alb  3.7  /  TBili  0.3  /  DBili  x   /  AST  28  /  ALT  27  /  AlkPhos  65  10-31    PT/INR - ( 31 Oct 2022 15:24 )   PT: 13.6 sec;   INR: 1.17 ratio         PTT - ( 01 Nov 2022 05:49 )  PTT:40.6 sec    CAPILLARY BLOOD GLUCOSE                MEDICATIONS  (STANDING):  aspirin enteric coated 81 milliGRAM(s) Oral daily  atorvastatin 80 milliGRAM(s) Oral at bedtime  metoprolol tartrate 12.5 milliGRAM(s) Oral two times a day  pantoprazole    Tablet 40 milliGRAM(s) Oral before breakfast  sodium chloride 0.9% Bolus 250 milliLiter(s) IV Bolus once    MEDICATIONS  (PRN):  acetaminophen     Tablet .. 650 milliGRAM(s) Oral every 6 hours PRN Temp greater or equal to 38C (100.4F), Mild Pain (1 - 3)      Care Discussed with Consultants/Other Providers [ ] YES  [ ] NO

## 2022-11-01 NOTE — PROGRESS NOTE ADULT - ASSESSMENT
A/P     Chest pain / NSTEMI  s/p cardiac cath : normal coronaries   d/c Heparin GTT   d/c brilinta     HTLD  -started on lipitor 80 mg q HS     dispo: d/c home on ecotrin 81 mg daily , d/c all other meds

## 2022-11-02 LAB
ERYTHROCYTE [SEDIMENTATION RATE] IN BLOOD: 45 MM/HR — HIGH (ref 0–20)
NT-PROBNP SERPL-SCNC: 3020 PG/ML — HIGH (ref 0–300)
TROPONIN T, HIGH SENSITIVITY RESULT: 96 NG/L — HIGH (ref 0–51)

## 2022-11-02 PROCEDURE — 75561 CARDIAC MRI FOR MORPH W/DYE: CPT | Mod: 26

## 2022-11-02 PROCEDURE — 99232 SBSQ HOSP IP/OBS MODERATE 35: CPT

## 2022-11-02 RX ORDER — METOPROLOL TARTRATE 50 MG
50 TABLET ORAL DAILY
Refills: 0 | Status: DISCONTINUED | OUTPATIENT
Start: 2022-11-03 | End: 2022-11-03

## 2022-11-02 RX ORDER — METOPROLOL TARTRATE 50 MG
25 TABLET ORAL ONCE
Refills: 0 | Status: COMPLETED | OUTPATIENT
Start: 2022-11-02 | End: 2022-11-02

## 2022-11-02 RX ADMIN — Medication 650 MILLIGRAM(S): at 21:59

## 2022-11-02 RX ADMIN — Medication 650 MILLIGRAM(S): at 09:00

## 2022-11-02 RX ADMIN — Medication 81 MILLIGRAM(S): at 05:07

## 2022-11-02 RX ADMIN — Medication 12.5 MILLIGRAM(S): at 05:07

## 2022-11-02 RX ADMIN — Medication 650 MILLIGRAM(S): at 07:54

## 2022-11-02 RX ADMIN — Medication 25 MILLIGRAM(S): at 15:32

## 2022-11-02 RX ADMIN — Medication 650 MILLIGRAM(S): at 22:29

## 2022-11-02 RX ADMIN — PANTOPRAZOLE SODIUM 40 MILLIGRAM(S): 20 TABLET, DELAYED RELEASE ORAL at 05:07

## 2022-11-02 NOTE — DISCHARGE NOTE PROVIDER - CARE PROVIDERS DIRECT ADDRESSES
[email or web address]

## 2022-11-02 NOTE — DISCHARGE NOTE PROVIDER - NSDCCPTREATMENT_GEN_ALL_CORE_FT
PRINCIPAL PROCEDURE  Procedure: Left heart cardiac cath  Findings and Treatment: normal coronaries, LVEDP 10      SECONDARY PROCEDURE  Procedure: Transthoracic echocardiography (TTE)  Findings and Treatment: EF 53%     PRINCIPAL PROCEDURE  Procedure: Left heart cardiac cath  Findings and Treatment: Normal coronaries.  Left main artery: The segment isvisually normal in size and  structure.  LAD   Proximal left anterior descending: The segment is visually normal in  size and structure. First diagonal: The segment is visually  normal in size and structure.    CX   Proximal circumflex: The segment is visually normal in size and  structure. First obtuse marginal: The segment is visually  normal in size and structure.    RCA   Proximal right coronary artery: The segment is visually normal in size  and structure.  Ramus   Ramus intermedius:The segment is visually normal in size and  structure.  Cardiac MRI  IMPRESSION:  Normal left ventricular size with mild apical ballooning with hypokinesis   and edema of the apical segments. No corresponding enhancement on the   late gadolinium images. Findings suggestive of apical ballooning   syndrome/takotsubo cardimyopathy.        SECONDARY PROCEDURE  Procedure: Transthoracic echocardiography (TTE)  Findings and Treatment: EF 53%

## 2022-11-02 NOTE — PROVIDER CONTACT NOTE (OTHER) - ASSESSMENT
pt a/ox4; VS documented in FS; pt states she feels pressure on her midsternal chest. pt denies SOB, Palpations.
pt a/ox4

## 2022-11-02 NOTE — DISCHARGE NOTE PROVIDER - CARE PROVIDER_API CALL
Holland Cam)  Cardiology  [address] [place] [address]  Phone: [phone]  Fax: [phone]  Follow Up Time: 2 weeks    Duncan Dawn)  Cardiology; Internal Medicine; Interventional Cardiology  [address] [place] [id number]  Phone: [phone]  Fax: [phone]  Follow Up Time: 2 weeks

## 2022-11-02 NOTE — DISCHARGE NOTE PROVIDER - NSDCCPCAREPLAN_GEN_ALL_CORE_FT
PRINCIPAL DISCHARGE DIAGNOSIS  Diagnosis: Chest pain  Assessment and Plan of Treatment: No heavy lifting or pushing/pulling with procedure arm for 2 weeks. No driving for 2 days. You may shower 24 hours following the procedure but avoid baths/swimming for 1 week. Check your wrist site for bleeding and/or swelling daily following procedure and call your doctor immediately if it occurs or if you experience increased pain at the site. Follow up with your cardiologist in 1-2 weeks. You may call [facility] if you have any questions/concerns regarding your procedure [phone].       PRINCIPAL DISCHARGE DIAGNOSIS  Diagnosis: Chest pain  Assessment and Plan of Treatment: S/p diagnostic left heart cath  No heavy lifting or pushing/pulling with procedure arm for 2 weeks. No driving for 2 days. You may shower 24 hours following the procedure but avoid baths/swimming for 1 week. Check your wrist site for bleeding and/or swelling daily following procedure and call your doctor immediately if it occurs or if you experience increased pain at the site. Follow up with your cardiologist in 1-2 weeks. You may call [facility] if you have any questions/concerns regarding your procedure [phone].

## 2022-11-02 NOTE — DISCHARGE NOTE PROVIDER - PROVIDER TOKENS
PROVIDER:[TOKEN:[[id number]],FOLLOWUP:[2 weeks]],PROVIDER:[TOKEN:[[id number]],FOLLOWUP:[2 weeks]]

## 2022-11-02 NOTE — PROGRESS NOTE ADULT - SUBJECTIVE AND OBJECTIVE BOX
Ms. Sexton reports no further chest pain or dyspnea.    Vital Signs Last 24 Hrs  T(C): 36.6 (02 Nov 2022 09:46), Max: 36.6 (01 Nov 2022 17:37)  T(F): 97.9 (02 Nov 2022 09:46), Max: 97.9 (01 Nov 2022 17:37)  HR: 77 (02 Nov 2022 09:46) (77 - 96)  BP: 103/62 (02 Nov 2022 09:46) (91/52 - 113/69)  BP(mean): 82 (02 Nov 2022 04:33) (63 - 88)  RR: 17 (02 Nov 2022 09:46) (17 - 18)  SpO2: 98% (02 Nov 2022 09:46) (98% - 99%)    General - awake, alert, comfortable  CV - RRR, no MRG  Lungs - CTAB  Abdomen - S/NT/ND  Extremities - no RANDAL  Psych - A/O x 3, appropriate affect    Tele:  NSR 80  PVC    Labs:                        11.4   6.33  )-----------( 187      ( 01 Nov 2022 05:49 )             34.0     11-01    139  |  105  |  11  ----------------------------<  97  4.0   |  23  |  0.55    Ca    8.8      01 Nov 2022 05:50  Mg     2.0     11-01    TPro  6.3  /  Alb  3.7  /  TBili  0.3  /  DBili  x   /  AST  28  /  ALT  27  /  AlkPhos  65  10-31    Elevated ESR/CRP    TTE and CMR reviewed    MEDICATIONS  (STANDING):  aspirin enteric coated 81 milliGRAM(s) Oral daily  atorvastatin 80 milliGRAM(s) Oral at bedtime  metoprolol tartrate 12.5 milliGRAM(s) Oral two times a day  pantoprazole    Tablet 40 milliGRAM(s) Oral before breakfast  sodium chloride 0.9% Bolus 250 milliLiter(s) IV Bolus once    Impression:  Differential diagnosis for the overall clinical and imaging findings favors a stress-induced cardiomyopathy - although no clear trigger.  No significant LVOT gradient    Recommendations:  Increase metoprolol tartrate to 25 mg PO bid (and subsequently to succinate formulation prior to discharge)  Check lipids with AM labs tomorrow - d/c statin  D/c ASA

## 2022-11-02 NOTE — PROGRESS NOTE ADULT - SUBJECTIVE AND OBJECTIVE BOX
Patient is a 67y old  Female who presents with a chief complaint of Chest pain with elevated cardiac enzymes (02 Nov 2022 14:31)      INTERVAL HPI/OVERNIGHT EVENTS:  T(C): 36.6 (11-02-22 @ 09:46), Max: 36.6 (11-02-22 @ 04:33)  HR: 93 (11-02-22 @ 15:33) (77 - 96)  BP: 109/68 (11-02-22 @ 15:33) (103/62 - 113/69)  RR: 17 (11-02-22 @ 09:46) (17 - 18)  SpO2: 98% (11-02-22 @ 09:46) (98% - 99%)  Wt(kg): --  I&O's Summary    02 Nov 2022 07:01  -  02 Nov 2022 23:53  --------------------------------------------------------  IN: 480 mL / OUT: 0 mL / NET: 480 mL        PAST MEDICAL & SURGICAL HISTORY:  No pertinent past medical history      No significant past surgical history          SOCIAL HISTORY  Alcohol:  Tobacco:  Illicit substance use:    FAMILY HISTORY:    REVIEW OF SYSTEMS:  CONSTITUTIONAL: No fever, weight loss, or fatigue  EYES: No eye pain, visual disturbances, or discharge  ENMT:  No difficulty hearing, tinnitus, vertigo; No sinus or throat pain  NECK: No pain or stiffness  RESPIRATORY: No cough, wheezing, chills or hemoptysis; No shortness of breath  CARDIOVASCULAR: No chest pain, palpitations, dizziness, or leg swelling  GASTROINTESTINAL: No abdominal or epigastric pain. No nausea, vomiting, or hematemesis; No diarrhea or constipation. No melena or hematochezia.  GENITOURINARY: No dysuria, frequency, hematuria, or incontinence  NEUROLOGICAL: No headaches, memory loss, loss of strength, numbness, or tremors  SKIN: No itching, burning, rashes, or lesions   LYMPH NODES: No enlarged glands  ENDOCRINE: No heat or cold intolerance; No hair loss  MUSCULOSKELETAL: No joint pain or swelling; No muscle, back, or extremity pain  PSYCHIATRIC: No depression, anxiety, mood swings, or difficulty sleeping  HEME/LYMPH: No easy bruising, or bleeding gums  ALLERY AND IMMUNOLOGIC: No hives or eczema    RADIOLOGY & ADDITIONAL TESTS:    Imaging Personally Reviewed:  [ ] YES  [ ] NO    Consultant(s) Notes Reviewed:  [ ] YES  [ ] NO    PHYSICAL EXAM:  GENERAL: NAD, well-groomed, well-developed  HEAD:  Atraumatic, Normocephalic  EYES: EOMI, PERRLA, conjunctiva and sclera clear  ENMT: No tonsillar erythema, exudates, or enlargement; Moist mucous membranes, Good dentition, No lesions  NECK: Supple, No JVD, Normal thyroid  NERVOUS SYSTEM:  Alert & Oriented X3, Good concentration; Motor Strength 5/5 B/L upper and lower extremities; DTRs 2+ intact and symmetric  CHEST/LUNG: Clear to percussion bilaterally; No rales, rhonchi, wheezing, or rubs  HEART: Regular rate and rhythm; No murmurs, rubs, or gallops  ABDOMEN: Soft, Nontender, Nondistended; Bowel sounds present  EXTREMITIES:  2+ Peripheral Pulses, No clubbing, cyanosis, or edema  LYMPH: No lymphadenopathy noted  SKIN: No rashes or lesions    LABS:                        11.4   6.33  )-----------( 187      ( 01 Nov 2022 05:49 )             34.0     11-01    139  |  105  |  11  ----------------------------<  97  4.0   |  23  |  0.55    Ca    8.8      01 Nov 2022 05:50  Mg     2.0     11-01      PTT - ( 01 Nov 2022 05:49 )  PTT:40.6 sec    CAPILLARY BLOOD GLUCOSE                MEDICATIONS  (STANDING):  pantoprazole    Tablet 40 milliGRAM(s) Oral before breakfast  sodium chloride 0.9% Bolus 250 milliLiter(s) IV Bolus once    MEDICATIONS  (PRN):  acetaminophen     Tablet .. 650 milliGRAM(s) Oral every 6 hours PRN Temp greater or equal to 38C (100.4F), Mild Pain (1 - 3)      Care Discussed with Consultants/Other Providers [ ] YES  [ ] NO Patient is a 67y old  Female who presents with a chief complaint of Chest pain with elevated cardiac enzymes (02 Nov 2022 14:31)      INTERVAL HPI/OVERNIGHT EVENTS: seen and examined, NAD   T(C): 36.6 (11-02-22 @ 09:46), Max: 36.6 (11-02-22 @ 04:33)  HR: 93 (11-02-22 @ 15:33) (77 - 96)  BP: 109/68 (11-02-22 @ 15:33) (103/62 - 113/69)  RR: 17 (11-02-22 @ 09:46) (17 - 18)  SpO2: 98% (11-02-22 @ 09:46) (98% - 99%)  Wt(kg): --  I&O's Summary    02 Nov 2022 07:01  -  02 Nov 2022 23:53  --------------------------------------------------------  IN: 480 mL / OUT: 0 mL / NET: 480 mL        PAST MEDICAL & SURGICAL HISTORY:  No pertinent past medical history      No significant past surgical history          SOCIAL HISTORY  Alcohol:  Tobacco:  Illicit substance use:    FAMILY HISTORY:    REVIEW OF SYSTEMS:  CONSTITUTIONAL: No fever, weight loss, or fatigue  EYES: No eye pain, visual disturbances, or discharge  ENMT:  No difficulty hearing, tinnitus, vertigo; No sinus or throat pain  NECK: No pain or stiffness  RESPIRATORY: No cough, wheezing, chills or hemoptysis; No shortness of breath  CARDIOVASCULAR: No chest pain, palpitations, dizziness, or leg swelling  GASTROINTESTINAL: No abdominal or epigastric pain. No nausea, vomiting, or hematemesis; No diarrhea or constipation. No melena or hematochezia.  GENITOURINARY: No dysuria, frequency, hematuria, or incontinence  NEUROLOGICAL: No headaches, memory loss, loss of strength, numbness, or tremors  SKIN: No itching, burning, rashes, or lesions   LYMPH NODES: No enlarged glands  ENDOCRINE: No heat or cold intolerance; No hair loss  MUSCULOSKELETAL: No joint pain or swelling; No muscle, back, or extremity pain  PSYCHIATRIC: No depression, anxiety, mood swings, or difficulty sleeping  HEME/LYMPH: No easy bruising, or bleeding gums  ALLERY AND IMMUNOLOGIC: No hives or eczema    RADIOLOGY & ADDITIONAL TESTS:    Imaging Personally Reviewed:  [ ] YES  [ ] NO    Consultant(s) Notes Reviewed:  [ ] YES  [ ] NO    PHYSICAL EXAM:  GENERAL: NAD, well-groomed, well-developed  HEAD:  Atraumatic, Normocephalic  EYES: EOMI, PERRLA, conjunctiva and sclera clear  ENMT: No tonsillar erythema, exudates, or enlargement; Moist mucous membranes, Good dentition, No lesions  NECK: Supple, No JVD, Normal thyroid  NERVOUS SYSTEM:  Alert & Oriented X3, Good concentration; Motor Strength 5/5 B/L upper and lower extremities; DTRs 2+ intact and symmetric  CHEST/LUNG: Clear to percussion bilaterally; No rales, rhonchi, wheezing, or rubs  HEART: Regular rate and rhythm; No murmurs, rubs, or gallops  ABDOMEN: Soft, Nontender, Nondistended; Bowel sounds present  EXTREMITIES:  2+ Peripheral Pulses, No clubbing, cyanosis, or edema  LYMPH: No lymphadenopathy noted  SKIN: No rashes or lesions    LABS:                        11.4   6.33  )-----------( 187      ( 01 Nov 2022 05:49 )             34.0     11-01    139  |  105  |  11  ----------------------------<  97  4.0   |  23  |  0.55    Ca    8.8      01 Nov 2022 05:50  Mg     2.0     11-01      PTT - ( 01 Nov 2022 05:49 )  PTT:40.6 sec    CAPILLARY BLOOD GLUCOSE                MEDICATIONS  (STANDING):  pantoprazole    Tablet 40 milliGRAM(s) Oral before breakfast  sodium chloride 0.9% Bolus 250 milliLiter(s) IV Bolus once    MEDICATIONS  (PRN):  acetaminophen     Tablet .. 650 milliGRAM(s) Oral every 6 hours PRN Temp greater or equal to 38C (100.4F), Mild Pain (1 - 3)      Care Discussed with Consultants/Other Providers [ ] YES  [ ] NO

## 2022-11-02 NOTE — DISCHARGE NOTE PROVIDER - HOSPITAL COURSE
HPI:  This is a 67 year old with no past medical history who comes in with chest pain for 2 days. The pain started on Saturday and felt like pressure across her chest. The pain did not radiate and became worse with exertion. Taking a deep breath caused the pressure in her chest to increase. The pain was not dependent on position. She had not had any pain with exertion previously.   Her pain has been constant since Saturday but has reduced in severity since. She wwent to her primary care doctor who got an EG which showed new T wave inversions laterally and so he sent her into the ED.  She denies any LE edema, orthopnea PND.  (31 Oct 2022 18:20)    11/1 diagnostic cardiac cath, no intervention. Right radial artery site without swelling, bleeding.

## 2022-11-02 NOTE — PROGRESS NOTE ADULT - ASSESSMENT
A/P     Chest pain / NSTEMI  s/p cardiac cath : normal coronaries   d/c Heparin GTT   d/c brilinta     Cardiomyopathy ?  -started on lopressor 25 mg bid     HLD  -started on lipitor 80 mg q HS     dispo: d/c home once cleared by cardio

## 2022-11-02 NOTE — DISCHARGE NOTE PROVIDER - NSDCMRMEDTOKEN_GEN_ALL_CORE_FT
Caltrate 600 + D oral tablet: 1 tab(s) orally 2 times a day  magnesium oxide: 1 tab(s) orally once a day  Pepcid 20 mg oral tablet: 1 tab(s) orally 2 times a day  Vitamin B12 1000 mcg oral tablet: 1 tab(s) orally once a day  Zinc 140 mg (as elemental zinc 50 mg) oral tablet: 1 tab(s) orally once a day   acetaminophen 325 mg oral tablet: 2 tab(s) orally every 6 hours, As needed, Temp greater or equal to 38C (100.4F), Mild Pain (1 - 3)  Caltrate 600 + D oral tablet: 1 tab(s) orally 2 times a day  magnesium oxide: 1 tab(s) orally once a day  metoprolol succinate 50 mg oral tablet, extended release: 1 tab(s) orally once a day  Pepcid 20 mg oral tablet: 1 tab(s) orally 2 times a day  Vitamin B12 1000 mcg oral tablet: 1 tab(s) orally once a day  Zinc 140 mg (as elemental zinc 50 mg) oral tablet: 1 tab(s) orally once a day

## 2022-11-03 ENCOUNTER — TRANSCRIPTION ENCOUNTER (OUTPATIENT)
Age: 67
End: 2022-11-03

## 2022-11-03 VITALS
RESPIRATION RATE: 16 BRPM | DIASTOLIC BLOOD PRESSURE: 70 MMHG | OXYGEN SATURATION: 100 % | SYSTOLIC BLOOD PRESSURE: 104 MMHG | TEMPERATURE: 98 F | HEART RATE: 76 BPM

## 2022-11-03 LAB
CHOLEST SERPL-MCNC: 120 MG/DL — SIGNIFICANT CHANGE UP
HDLC SERPL-MCNC: 57 MG/DL — SIGNIFICANT CHANGE UP
LIPID PNL WITH DIRECT LDL SERPL: 50 MG/DL — SIGNIFICANT CHANGE UP
NON HDL CHOLESTEROL: 63 MG/DL — SIGNIFICANT CHANGE UP
TRIGL SERPL-MCNC: 61 MG/DL — SIGNIFICANT CHANGE UP

## 2022-11-03 PROCEDURE — C1894: CPT

## 2022-11-03 PROCEDURE — 82435 ASSAY OF BLOOD CHLORIDE: CPT

## 2022-11-03 PROCEDURE — A9585: CPT

## 2022-11-03 PROCEDURE — 80061 LIPID PANEL: CPT

## 2022-11-03 PROCEDURE — 36415 COLL VENOUS BLD VENIPUNCTURE: CPT

## 2022-11-03 PROCEDURE — 84132 ASSAY OF SERUM POTASSIUM: CPT

## 2022-11-03 PROCEDURE — 85014 HEMATOCRIT: CPT

## 2022-11-03 PROCEDURE — 83735 ASSAY OF MAGNESIUM: CPT

## 2022-11-03 PROCEDURE — 83880 ASSAY OF NATRIURETIC PEPTIDE: CPT

## 2022-11-03 PROCEDURE — 85027 COMPLETE CBC AUTOMATED: CPT

## 2022-11-03 PROCEDURE — 85730 THROMBOPLASTIN TIME PARTIAL: CPT

## 2022-11-03 PROCEDURE — 86141 C-REACTIVE PROTEIN HS: CPT

## 2022-11-03 PROCEDURE — C1887: CPT

## 2022-11-03 PROCEDURE — 84295 ASSAY OF SERUM SODIUM: CPT

## 2022-11-03 PROCEDURE — 96374 THER/PROPH/DIAG INJ IV PUSH: CPT

## 2022-11-03 PROCEDURE — 80053 COMPREHEN METABOLIC PANEL: CPT

## 2022-11-03 PROCEDURE — 93458 L HRT ARTERY/VENTRICLE ANGIO: CPT

## 2022-11-03 PROCEDURE — 83605 ASSAY OF LACTIC ACID: CPT

## 2022-11-03 PROCEDURE — 93306 TTE W/DOPPLER COMPLETE: CPT

## 2022-11-03 PROCEDURE — 85652 RBC SED RATE AUTOMATED: CPT

## 2022-11-03 PROCEDURE — 85025 COMPLETE CBC W/AUTO DIFF WBC: CPT

## 2022-11-03 PROCEDURE — 84484 ASSAY OF TROPONIN QUANT: CPT

## 2022-11-03 PROCEDURE — 87637 SARSCOV2&INF A&B&RSV AMP PRB: CPT

## 2022-11-03 PROCEDURE — 82803 BLOOD GASES ANY COMBINATION: CPT

## 2022-11-03 PROCEDURE — 75561 CARDIAC MRI FOR MORPH W/DYE: CPT

## 2022-11-03 PROCEDURE — 85610 PROTHROMBIN TIME: CPT

## 2022-11-03 PROCEDURE — 99285 EMERGENCY DEPT VISIT HI MDM: CPT

## 2022-11-03 PROCEDURE — 80048 BASIC METABOLIC PNL TOTAL CA: CPT

## 2022-11-03 PROCEDURE — C1769: CPT

## 2022-11-03 PROCEDURE — 86140 C-REACTIVE PROTEIN: CPT

## 2022-11-03 PROCEDURE — 82947 ASSAY GLUCOSE BLOOD QUANT: CPT

## 2022-11-03 PROCEDURE — 93005 ELECTROCARDIOGRAM TRACING: CPT

## 2022-11-03 PROCEDURE — 85018 HEMOGLOBIN: CPT

## 2022-11-03 PROCEDURE — 93308 TTE F-UP OR LMTD: CPT

## 2022-11-03 PROCEDURE — 82330 ASSAY OF CALCIUM: CPT

## 2022-11-03 PROCEDURE — 86803 HEPATITIS C AB TEST: CPT

## 2022-11-03 RX ORDER — ACETAMINOPHEN 500 MG
2 TABLET ORAL
Qty: 0 | Refills: 0 | DISCHARGE
Start: 2022-11-03

## 2022-11-03 RX ORDER — CHLORHEXIDINE GLUCONATE 213 G/1000ML
1 SOLUTION TOPICAL ONCE
Refills: 0 | Status: DISCONTINUED | OUTPATIENT
Start: 2022-11-03 | End: 2022-11-03

## 2022-11-03 RX ORDER — METOPROLOL TARTRATE 50 MG
1 TABLET ORAL
Qty: 30 | Refills: 0
Start: 2022-11-03 | End: 2022-12-02

## 2022-11-03 RX ADMIN — PANTOPRAZOLE SODIUM 40 MILLIGRAM(S): 20 TABLET, DELAYED RELEASE ORAL at 05:09

## 2022-11-03 RX ADMIN — Medication 50 MILLIGRAM(S): at 05:09

## 2022-11-03 NOTE — DISCHARGE NOTE NURSING/CASE MANAGEMENT/SOCIAL WORK - NSDCPEFALRISK_GEN_ALL_CORE
For information on Fall & Injury Prevention, visit: [email or web address]  [email or web address] OR  https://www.cdc.gov/steadi/patient.html

## 2022-11-03 NOTE — DISCHARGE NOTE NURSING/CASE MANAGEMENT/SOCIAL WORK - PATIENT PORTAL LINK FT
You can access the FollowMyHealth Patient Portal offered by [facility] by registering at the following website: [email or web address]. By joining [facility]’s FollowMyHealth portal, you will also be able to view your health information using other applications (apps) compatible with our system.

## 2022-11-03 NOTE — PROGRESS NOTE ADULT - SUBJECTIVE AND OBJECTIVE BOX
Ms. Sexton reports that she feels "98%".    Vital Signs Last 24 Hrs  T(C): 36.4 (03 Nov 2022 12:45), Max: 36.5 (03 Nov 2022 10:28)  T(F): 97.6 (03 Nov 2022 12:45), Max: 97.7 (03 Nov 2022 10:28)  HR: 76 (03 Nov 2022 12:45) (62 - 93)  BP: 104/70 (03 Nov 2022 12:45) (95/61 - 109/68)  BP(mean): 81 (03 Nov 2022 12:45) (72 - 81)  SpO2: 100% (03 Nov 2022 12:45) (97% - 100%)    General - awake, alert, comfortable  CV - RRR, no MRG  Lungs - CTAB  Abdomen - S/NT/ND  Extremities - no RANDAL  Psych - A/O x 3, appropriate affect    Tele:  NSR 60    Labs:  Elevated ESR/CRP    Normal lipids    TTE and CMR reviewed    MEDICATIONS  (STANDING):  metoprolol succinate ER 50 milliGRAM(s) Oral daily  pantoprazole    Tablet 40 milliGRAM(s) Oral before breakfast    Impression:  67W  Differential diagnosis for the overall clinical and imaging findings favors a stress-induced cardiomyopathy - although no clear trigger.  No significant LVOT gradient  Normal coronary luminogram    Recommendations:  Continue metoprolol tartrate 50 mg daily  Repeat TTE as outpatient    A total of 25 minutes was spent on this patient encounter.

## 2022-12-19 NOTE — PATIENT PROFILE ADULT - HAVE YOU EXPERIENCED VIOLENCE OR A TRAUMATIC EVENT?
no

## 2022-12-28 ENCOUNTER — APPOINTMENT (OUTPATIENT)
Dept: CARDIOLOGY | Facility: CLINIC | Age: 67
End: 2022-12-28

## 2023-02-25 ENCOUNTER — TRANSCRIPTION ENCOUNTER (OUTPATIENT)
Age: 68
End: 2023-02-25

## 2023-02-25 ENCOUNTER — INPATIENT (INPATIENT)
Facility: HOSPITAL | Age: 68
LOS: 2 days | Discharge: HOME | DRG: 493 | End: 2023-02-28
Attending: STUDENT IN AN ORGANIZED HEALTH CARE EDUCATION/TRAINING PROGRAM | Admitting: STUDENT IN AN ORGANIZED HEALTH CARE EDUCATION/TRAINING PROGRAM
Payer: COMMERCIAL

## 2023-02-25 VITALS
HEART RATE: 91 BPM | SYSTOLIC BLOOD PRESSURE: 92 MMHG | TEMPERATURE: 98 F | RESPIRATION RATE: 18 BRPM | OXYGEN SATURATION: 98 % | WEIGHT: 110.01 LBS | HEIGHT: 63 IN | DIASTOLIC BLOOD PRESSURE: 62 MMHG
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
## 2023-02-25 DIAGNOSIS — I51.81 TAKOTSUBO SYNDROME: ICD-10-CM

## 2023-02-25 DIAGNOSIS — K29.70 GASTRITIS, UNSPECIFIED, WITHOUT BLEEDING: ICD-10-CM

## 2023-02-25 DIAGNOSIS — Z29.9 ENCOUNTER FOR PROPHYLACTIC MEASURES, UNSPECIFIED: ICD-10-CM

## 2023-02-25 DIAGNOSIS — S82.842A DISPLACED BIMALLEOLAR FRACTURE OF LEFT LOWER LEG, INITIAL ENCOUNTER FOR CLOSED FRACTURE: ICD-10-CM

## 2023-02-25 DIAGNOSIS — S82.892A OTHER FRACTURE OF LEFT LOWER LEG, INITIAL ENCOUNTER FOR CLOSED FRACTURE: ICD-10-CM

## 2023-02-25 DIAGNOSIS — Z98.51 TUBAL LIGATION STATUS: Chronic | ICD-10-CM

## 2023-02-25 LAB
ALBUMIN SERPL ELPH-MCNC: 4.1 G/DL — SIGNIFICANT CHANGE UP (ref 3.3–5)
ALP SERPL-CCNC: 97 U/L — SIGNIFICANT CHANGE UP (ref 40–120)
ALT FLD-CCNC: 15 U/L — SIGNIFICANT CHANGE UP (ref 10–45)
ANION GAP SERPL CALC-SCNC: 11 MMOL/L — SIGNIFICANT CHANGE UP (ref 5–17)
APPEARANCE UR: CLEAR — SIGNIFICANT CHANGE UP
APTT BLD: 27.9 SEC — SIGNIFICANT CHANGE UP (ref 27.5–35.5)
AST SERPL-CCNC: 13 U/L — SIGNIFICANT CHANGE UP (ref 10–40)
BACTERIA # UR AUTO: NEGATIVE — SIGNIFICANT CHANGE UP
BASOPHILS # BLD AUTO: 0.03 K/UL — SIGNIFICANT CHANGE UP (ref 0–0.2)
BASOPHILS NFR BLD AUTO: 0.3 % — SIGNIFICANT CHANGE UP (ref 0–2)
BILIRUB SERPL-MCNC: 0.2 MG/DL — SIGNIFICANT CHANGE UP (ref 0.2–1.2)
BILIRUB UR-MCNC: NEGATIVE — SIGNIFICANT CHANGE UP
BLD GP AB SCN SERPL QL: NEGATIVE — SIGNIFICANT CHANGE UP
BUN SERPL-MCNC: 14 MG/DL — SIGNIFICANT CHANGE UP (ref 7–23)
CALCIUM SERPL-MCNC: 9.1 MG/DL — SIGNIFICANT CHANGE UP (ref 8.4–10.5)
CHLORIDE SERPL-SCNC: 100 MMOL/L — SIGNIFICANT CHANGE UP (ref 96–108)
CO2 SERPL-SCNC: 27 MMOL/L — SIGNIFICANT CHANGE UP (ref 22–31)
COLOR SPEC: SIGNIFICANT CHANGE UP
CREAT SERPL-MCNC: 0.59 MG/DL — SIGNIFICANT CHANGE UP (ref 0.5–1.3)
DIFF PNL FLD: NEGATIVE — SIGNIFICANT CHANGE UP
EGFR: 99 ML/MIN/1.73M2 — SIGNIFICANT CHANGE UP
EOSINOPHIL # BLD AUTO: 0.08 K/UL — SIGNIFICANT CHANGE UP (ref 0–0.5)
EOSINOPHIL NFR BLD AUTO: 0.7 % — SIGNIFICANT CHANGE UP (ref 0–6)
EPI CELLS # UR: 2 /HPF — SIGNIFICANT CHANGE UP
FLUAV AG NPH QL: SIGNIFICANT CHANGE UP
FLUBV AG NPH QL: SIGNIFICANT CHANGE UP
GLUCOSE SERPL-MCNC: 116 MG/DL — HIGH (ref 70–99)
GLUCOSE UR QL: NEGATIVE — SIGNIFICANT CHANGE UP
HCT VFR BLD CALC: 38 % — SIGNIFICANT CHANGE UP (ref 34.5–45)
HGB BLD-MCNC: 12.3 G/DL — SIGNIFICANT CHANGE UP (ref 11.5–15.5)
HYALINE CASTS # UR AUTO: 0 /LPF — SIGNIFICANT CHANGE UP (ref 0–2)
IMM GRANULOCYTES NFR BLD AUTO: 0.4 % — SIGNIFICANT CHANGE UP (ref 0–0.9)
INR BLD: 1.07 RATIO — SIGNIFICANT CHANGE UP (ref 0.88–1.16)
KETONES UR-MCNC: NEGATIVE — SIGNIFICANT CHANGE UP
LEUKOCYTE ESTERASE UR-ACNC: NEGATIVE — SIGNIFICANT CHANGE UP
LYMPHOCYTES # BLD AUTO: 0.76 K/UL — LOW (ref 1–3.3)
LYMPHOCYTES # BLD AUTO: 6.5 % — LOW (ref 13–44)
MCHC RBC-ENTMCNC: 29.3 PG — SIGNIFICANT CHANGE UP (ref 27–34)
MCHC RBC-ENTMCNC: 32.4 GM/DL — SIGNIFICANT CHANGE UP (ref 32–36)
MCV RBC AUTO: 90.5 FL — SIGNIFICANT CHANGE UP (ref 80–100)
MONOCYTES # BLD AUTO: 0.48 K/UL — SIGNIFICANT CHANGE UP (ref 0–0.9)
MONOCYTES NFR BLD AUTO: 4.1 % — SIGNIFICANT CHANGE UP (ref 2–14)
NEUTROPHILS # BLD AUTO: 10.22 K/UL — HIGH (ref 1.8–7.4)
NEUTROPHILS NFR BLD AUTO: 88 % — HIGH (ref 43–77)
NITRITE UR-MCNC: NEGATIVE — SIGNIFICANT CHANGE UP
NRBC # BLD: 0 /100 WBCS — SIGNIFICANT CHANGE UP (ref 0–0)
PH UR: 7.5 — SIGNIFICANT CHANGE UP (ref 5–8)
PLATELET # BLD AUTO: 258 K/UL — SIGNIFICANT CHANGE UP (ref 150–400)
POTASSIUM SERPL-MCNC: 4.1 MMOL/L — SIGNIFICANT CHANGE UP (ref 3.5–5.3)
POTASSIUM SERPL-SCNC: 4.1 MMOL/L — SIGNIFICANT CHANGE UP (ref 3.5–5.3)
PROT SERPL-MCNC: 6.8 G/DL — SIGNIFICANT CHANGE UP (ref 6–8.3)
PROT UR-MCNC: SIGNIFICANT CHANGE UP
PROTHROM AB SERPL-ACNC: 12.3 SEC — SIGNIFICANT CHANGE UP (ref 10.5–13.4)
RBC # BLD: 4.2 M/UL — SIGNIFICANT CHANGE UP (ref 3.8–5.2)
RBC # FLD: 12.5 % — SIGNIFICANT CHANGE UP (ref 10.3–14.5)
RBC CASTS # UR COMP ASSIST: 2 /HPF — SIGNIFICANT CHANGE UP (ref 0–4)
RH IG SCN BLD-IMP: POSITIVE — SIGNIFICANT CHANGE UP
RSV RNA NPH QL NAA+NON-PROBE: SIGNIFICANT CHANGE UP
SARS-COV-2 RNA SPEC QL NAA+PROBE: SIGNIFICANT CHANGE UP
SODIUM SERPL-SCNC: 138 MMOL/L — SIGNIFICANT CHANGE UP (ref 135–145)
SP GR SPEC: 1.01 — SIGNIFICANT CHANGE UP (ref 1.01–1.02)
TROPONIN T, HIGH SENSITIVITY RESULT: <6 NG/L — SIGNIFICANT CHANGE UP (ref 0–51)
UROBILINOGEN FLD QL: NEGATIVE — SIGNIFICANT CHANGE UP
WBC # BLD: 11.62 K/UL — HIGH (ref 3.8–10.5)
WBC # FLD AUTO: 11.62 K/UL — HIGH (ref 3.8–10.5)
WBC UR QL: 1 /HPF — SIGNIFICANT CHANGE UP (ref 0–5)

## 2023-02-25 PROCEDURE — 93306 TTE W/DOPPLER COMPLETE: CPT | Mod: 26

## 2023-02-25 PROCEDURE — 71045 X-RAY EXAM CHEST 1 VIEW: CPT | Mod: 26

## 2023-02-25 PROCEDURE — 76377 3D RENDER W/INTRP POSTPROCES: CPT | Mod: 26

## 2023-02-25 PROCEDURE — 73590 X-RAY EXAM OF LOWER LEG: CPT | Mod: 26,LT

## 2023-02-25 PROCEDURE — 99222 1ST HOSP IP/OBS MODERATE 55: CPT

## 2023-02-25 PROCEDURE — 99285 EMERGENCY DEPT VISIT HI MDM: CPT

## 2023-02-25 PROCEDURE — 73700 CT LOWER EXTREMITY W/O DYE: CPT | Mod: 26,LT

## 2023-02-25 PROCEDURE — 73630 X-RAY EXAM OF FOOT: CPT | Mod: 26,LT

## 2023-02-25 PROCEDURE — 76376 3D RENDER W/INTRP POSTPROCES: CPT | Mod: 26

## 2023-02-25 PROCEDURE — 73610 X-RAY EXAM OF ANKLE: CPT | Mod: 26,LT

## 2023-02-25 RX ORDER — TRAMADOL HYDROCHLORIDE 50 MG/1
25 TABLET ORAL EVERY 4 HOURS
Refills: 0 | Status: DISCONTINUED | OUTPATIENT
Start: 2023-02-25 | End: 2023-02-26

## 2023-02-25 RX ORDER — KETOROLAC TROMETHAMINE 30 MG/ML
15 SYRINGE (ML) INJECTION ONCE
Refills: 0 | Status: DISCONTINUED | OUTPATIENT
Start: 2023-02-25 | End: 2023-02-25

## 2023-02-25 RX ORDER — FENTANYL CITRATE 50 UG/ML
25 INJECTION INTRAVENOUS ONCE
Refills: 0 | Status: DISCONTINUED | OUTPATIENT
Start: 2023-02-25 | End: 2023-02-25

## 2023-02-25 RX ORDER — PANTOPRAZOLE SODIUM 20 MG/1
40 TABLET, DELAYED RELEASE ORAL
Refills: 0 | Status: DISCONTINUED | OUTPATIENT
Start: 2023-02-25 | End: 2023-02-26

## 2023-02-25 RX ORDER — METOPROLOL TARTRATE 50 MG
25 TABLET ORAL DAILY
Refills: 0 | Status: DISCONTINUED | OUTPATIENT
Start: 2023-02-25 | End: 2023-02-25

## 2023-02-25 RX ORDER — ACETAMINOPHEN 500 MG
650 TABLET ORAL EVERY 6 HOURS
Refills: 0 | Status: DISCONTINUED | OUTPATIENT
Start: 2023-02-25 | End: 2023-02-26

## 2023-02-25 RX ORDER — MORPHINE SULFATE 50 MG/1
1 CAPSULE, EXTENDED RELEASE ORAL EVERY 4 HOURS
Refills: 0 | Status: DISCONTINUED | OUTPATIENT
Start: 2023-02-25 | End: 2023-02-26

## 2023-02-25 RX ORDER — SENNA PLUS 8.6 MG/1
2 TABLET ORAL AT BEDTIME
Refills: 0 | Status: DISCONTINUED | OUTPATIENT
Start: 2023-02-25 | End: 2023-02-26

## 2023-02-25 RX ORDER — ONDANSETRON 8 MG/1
4 TABLET, FILM COATED ORAL EVERY 8 HOURS
Refills: 0 | Status: DISCONTINUED | OUTPATIENT
Start: 2023-02-25 | End: 2023-02-26

## 2023-02-25 RX ORDER — LIDOCAINE HCL 20 MG/ML
20 VIAL (ML) INJECTION ONCE
Refills: 0 | Status: COMPLETED | OUTPATIENT
Start: 2023-02-25 | End: 2023-02-25

## 2023-02-25 RX ORDER — LANOLIN ALCOHOL/MO/W.PET/CERES
3 CREAM (GRAM) TOPICAL AT BEDTIME
Refills: 0 | Status: DISCONTINUED | OUTPATIENT
Start: 2023-02-25 | End: 2023-02-26

## 2023-02-25 RX ORDER — SUCRALFATE 1 G
1 TABLET ORAL
Refills: 0 | Status: DISCONTINUED | OUTPATIENT
Start: 2023-02-25 | End: 2023-02-26

## 2023-02-25 RX ORDER — SODIUM CHLORIDE 9 MG/ML
500 INJECTION INTRAMUSCULAR; INTRAVENOUS; SUBCUTANEOUS ONCE
Refills: 0 | Status: COMPLETED | OUTPATIENT
Start: 2023-02-25 | End: 2023-02-25

## 2023-02-25 RX ORDER — TRAZODONE HCL 50 MG
50 TABLET ORAL AT BEDTIME
Refills: 0 | Status: DISCONTINUED | OUTPATIENT
Start: 2023-02-25 | End: 2023-02-26

## 2023-02-25 RX ORDER — ACETAMINOPHEN 500 MG
650 TABLET ORAL ONCE
Refills: 0 | Status: COMPLETED | OUTPATIENT
Start: 2023-02-25 | End: 2023-02-25

## 2023-02-25 RX ADMIN — MORPHINE SULFATE 1 MILLIGRAM(S): 50 CAPSULE, EXTENDED RELEASE ORAL at 22:30

## 2023-02-25 RX ADMIN — Medication 15 MILLIGRAM(S): at 08:13

## 2023-02-25 RX ADMIN — Medication 650 MILLIGRAM(S): at 08:13

## 2023-02-25 RX ADMIN — MORPHINE SULFATE 1 MILLIGRAM(S): 50 CAPSULE, EXTENDED RELEASE ORAL at 22:10

## 2023-02-25 RX ADMIN — Medication 650 MILLIGRAM(S): at 11:31

## 2023-02-25 RX ADMIN — FENTANYL CITRATE 25 MICROGRAM(S): 50 INJECTION INTRAVENOUS at 05:42

## 2023-02-25 RX ADMIN — FENTANYL CITRATE 25 MICROGRAM(S): 50 INJECTION INTRAVENOUS at 08:13

## 2023-02-25 RX ADMIN — TRAMADOL HYDROCHLORIDE 25 MILLIGRAM(S): 50 TABLET ORAL at 17:09

## 2023-02-25 RX ADMIN — Medication 650 MILLIGRAM(S): at 10:30

## 2023-02-25 RX ADMIN — PANTOPRAZOLE SODIUM 40 MILLIGRAM(S): 20 TABLET, DELAYED RELEASE ORAL at 17:52

## 2023-02-25 RX ADMIN — Medication 650 MILLIGRAM(S): at 04:40

## 2023-02-25 RX ADMIN — MORPHINE SULFATE 1 MILLIGRAM(S): 50 CAPSULE, EXTENDED RELEASE ORAL at 17:49

## 2023-02-25 RX ADMIN — Medication 1 GRAM(S): at 21:43

## 2023-02-25 RX ADMIN — Medication 50 MILLIGRAM(S): at 22:08

## 2023-02-25 RX ADMIN — SODIUM CHLORIDE 500 MILLILITER(S): 9 INJECTION INTRAMUSCULAR; INTRAVENOUS; SUBCUTANEOUS at 08:33

## 2023-02-25 RX ADMIN — SENNA PLUS 2 TABLET(S): 8.6 TABLET ORAL at 21:44

## 2023-02-25 RX ADMIN — MORPHINE SULFATE 1 MILLIGRAM(S): 50 CAPSULE, EXTENDED RELEASE ORAL at 18:19

## 2023-02-25 RX ADMIN — Medication 15 MILLIGRAM(S): at 05:10

## 2023-02-25 RX ADMIN — TRAMADOL HYDROCHLORIDE 25 MILLIGRAM(S): 50 TABLET ORAL at 16:09

## 2023-02-25 NOTE — ED ADULT NURSE NOTE - OBJECTIVE STATEMENT
Pt is 67 y/o female, presenting to the ED via EMS s/p syncope c/o L ankle deformity. As per pt, was in her kitchen when she had a syncopal episode. Pt does not remember how it happened, but has obvious L ankle deformity. PMH of Takotsubo cardiomyopathy and GERD. Upon assessment, pt AxOx3, sitting up in stretcher and speaking in full sentences. Breathing spontaneously and unlabored, >95% RA. Abdomen soft and nontender to palpation. EKG done, given to MD, pt placed on continuous cardiac monitor. Pt moves all extremities w/ = strength. Skin is warm, dry, and intact w/ + peripheral pulses. Pt denies SOB, chest pain, n/v/d, vision changes, chills, and fever. Safety and comfort measures provided- bed in lowest position, locked, and blanket given.

## 2023-02-25 NOTE — ED PROVIDER NOTE - PROGRESS NOTE DETAILS
Jay Ashford MD, PGY-1: Patient found to have multiple fracture fragments in the left ankle.  Orthopedics to be consulted for possible operative management.  We will continue to manage pain with medication as needed. Jay Ashford MD, PGY-1: Patient evaluated by orthopedics, will attempt reduction at bedside.  They anticipate that she will need surgical fixation.  However, she prior needs  medical screening given her past medical history.  She is to be admitted to medicine.

## 2023-02-25 NOTE — ED PROVIDER NOTE - CLINICAL SUMMARY MEDICAL DECISION MAKING FREE TEXT BOX
68-year-old female with fall associated with dizziness.  There is clear deformity to left ankle with significant concern for fracture of tibia, fibula, ankle, foot.  She is neurovascularly intact.  Will obtain x-rays, give pain medications, obtain labs in anticipation of admission.

## 2023-02-25 NOTE — H&P ADULT - PROBLEM SELECTOR PLAN 3
Hx of Takotsubo with normal EF 11/2022  - c/w toprol Hx of Takotsubo with normal EF 11/2022. Echo this admission with resolved WMA.   - c/w toprol

## 2023-02-25 NOTE — ED PROVIDER NOTE - ATTENDING CONTRIBUTION TO CARE
attending Pradip: 68yF h/o Takotsubo cardiomyopathy, GERD, presents after episode of dizziness, and subsequent fall with left ankle injury. Denies chest pain or LOC. Obvious deformity to L ankle with concern for fx, ?dislocation. Neurovasc intact LLE. Will obtain ekg, place on tele, labs, imaging eval fx, pain control, ortho eval in ED, pt will require admission

## 2023-02-25 NOTE — ED ADULT NURSE REASSESSMENT NOTE - NS ED NURSE REASSESS COMMENT FT1
Pleasantly cooperative lady with known Dx of left ankle fracture. Seen by ortho and ankle casted. States pain is improved. Described as 'ache.' No chest pain, shortness of breath, or GI upset. Informed by MD to remain NPO. Room assignment pending. Hypotensive but asymptomatic. MD notified.

## 2023-02-25 NOTE — PATIENT PROFILE ADULT - FALL HARM RISK - HARM RISK INTERVENTIONS
Assistance with ambulation/Assistance OOB with selected safe patient handling equipment/Communicate Risk of Fall with Harm to all staff/Discuss with provider need for PT consult/Monitor gait and stability/Provide patient with walking aids - walker, cane, crutches/Reinforce activity limits and safety measures with patient and family/Sit up slowly, dangle for a short time, stand at bedside before walking/Tailored Fall Risk Interventions/Visual Cue: Yellow wristband and red socks/Bed in lowest position, wheels locked, appropriate side rails in place/Call bell, personal items and telephone in reach/Instruct patient to call for assistance before getting out of bed or chair/Non-slip footwear when patient is out of bed/[place] to call system/Physically safe environment - no spills, clutter or unnecessary equipment/Purposeful Proactive Rounding/Room/bathroom lighting operational, light cord in reach

## 2023-02-25 NOTE — PATIENT PROFILE ADULT - FUNCTIONAL ASSESSMENT - BASIC MOBILITY 6.
1-calculated by average/Not able to assess (calculate score using [facility] averaging method)

## 2023-02-25 NOTE — H&P ADULT - PROBLEM SELECTOR PLAN 2
Fall provoked by dizziness, which is now resolved  - f/u orthostatics when able to stand  - PT eval when able per ortho

## 2023-02-25 NOTE — H&P ADULT - HISTORY OF PRESENT ILLNESS
68F with recently diagnosed Takotsubo cardiomyopathy, gastritis felt under the weather this morning. She was walking from the kitchen to her bedroom when she felt dizzy and tripped over a rug. She did not experience any chest pain or shortness of breath. She denies head trauma or LOC. She felt a lot of pain in her ankle and noted it was deformed and has not been able to stand/ambulate.     Of note she was diagnosed with Takotsubo cardiomyopathy in 11/2022 and gastritis on EGD 01/2023. She has excellent functional status and lives alone and completes all her own ADLs.     IN the ED, pt hemodynamically stable with borderline low blood pressures (has a history). Ortho reduced her fracture, plans for surgical fixation 2/26.

## 2023-02-25 NOTE — H&P ADULT - NSHPREVIEWOFSYSTEMS_GEN_ALL_CORE
REVIEW OF SYSTEMS:  CONSTITUTIONAL: No fever, chills, or fatigue  EYES: No eye pain, visual disturbances, or discharge  ENMT:  No difficulty hearing, No sinus or throat pain  NECK: No pain or stiffness  RESPIRATORY: No cough, wheezing, or hemoptysis; No shortness of breath  CARDIOVASCULAR: No chest pain, palpitations, dizziness, or leg swelling  GASTROINTESTINAL: +constipation, No abdominal or epigastric pain. No nausea, vomiting, or hematemesis; No diarrhea  GENITOURINARY: No dysuria, frequency, hematuria, or incontinence  NEUROLOGICAL: No headaches, memory loss, loss of strength, numbness, or tremors  SKIN: No itching, burning, rashes, or lesions   LYMPH NODES: No enlarged glands  ENDOCRINE: No heat or cold intolerance; No hair loss  MUSCULOSKELETAL: +left ankle pain/fracture. No muscle, back pain  HEME/LYMPH: No easy bruising, or bleeding gums  ALLERGY AND IMMUNOLOGIC: No hives or eczema

## 2023-02-25 NOTE — ED ADULT TRIAGE NOTE - TEMPERATURE IN FAHRENHEIT (DEGREES F)
Assessment/Plan:    COPD (chronic obstructive pulmonary disease) [facility])  Mr Duane Parks was diagnosed with COPD many years back and is currently on treatment with Breo 100, Incruse and Ventolin  Currently his exercise tolerance is more than 2 blocks and he has occasional cough and no wheezing  Kendrick Gimenez He is a never smoker but admits to secondhand smoke exposure  He also worked with beryllium while in [employer] industry  His previous PFT from May 2015 showed severe airflow obstruction with diffusion defect  He also had evidence of air trapping  There was improvement in FEV1 with bronchodilator indicating a component of asthma  His previous CT scan from February 2019 showed scarring in the left lung  He denies any runny nose or sneezing  On clinical examination, his chest auscultation revealed bilateral inspiratory coarse crackles at lung bases  His high-resolution CT scan of the chest showed bilateral pulmonary fibrosis  I have advised him to continue breo and incruse  I had a long discussion with him and have answered all his questions    Pulmonary fibrosis ([address] )  His recent high-resolution CT scan of the chest showed mild diffuse bilateral reticulation and mild scattered linear scar   Mild traction bronchiolectasis in the lateral basal left lower lobe   Chronic opacity in the paraspinal right lower lobe with volume loss as evidenced by crowding of the bronchi and mild traction bronchiolectasis due to chronic scar  The etiology is not clear at this point  His previous PFT showed severe airflow obstruction with diffusion defect  his repeat PFT showed moderate airflow obstruction with severe diffusion defect  Chest auscultation revealed bilateral coarse inspiratory crackles  He currently does not need any oxygen  The etiology of this pulmonary fibrosis is not clear at this point  He has emphysema  He has previous history of sarcoidosis  He has history of beryllium exposure  he also had COVID    His CT scan does not show a UIP pattern  His pulmonary function has not deteriorated  We will continue to monitor him closely  At this point he would not benefit from antifibrotic therapy  I had a long discussion with him and answered all his questions    Sarcoidosis  He was diagnosed with sarcoidosis many years back after a lung biopsy and was given steroid therapy for some time  His previous PFT from May 2015 showed severe airflow obstruction with diffusion defect  He has history of working with beryllium   But the sarcoidosis happened before his exposure to beryllium  Diagnoses and all orders for this visit:    Chronic obstructive pulmonary disease, unspecified COPD type ([address] )    Pulmonary fibrosis (HCC)    Sarcoidosis          Subjective:      Patient ID: Verita Gottron is a 78 y o  male  Jayme Sullivan came for follow-up for his COPD emphysema, pulmonary fibrosis  Currently his exercise tolerance is 1 and half to 2 blocks and he does not have any significant cough or phlegm or wheeze or chest pain  No swelling of feet  He has been on treatment with Breo and Incruse  Has no hoarseness of voice or dysphagia  Her an symptomatically he is feeling much better  He had a repeat PFT recently which showed moderately severe airflow obstruction with severe diffusion defect  He had evidence of air trapping  He did not require any oxygen supplementation on 6 minute walk  His 6 minute walk distance was 274 m  He had COVID infection before and he also had sarcoidosis in the past   He also has history of ankylosing spondylitis  He recently had upper GI endoscopy which showed showed Gee's esophagus and candidal esophagitis  Occasional chest tightness  He also has coronary artery disease  His previous echocardiogram showed no significant pulmonary hypertension and good ejection fraction  Follows with Dr Jairo Martínez  He also has previous exposure to beryllium        The following portions of the patient's history were reviewed and updated as appropriate: allergies, current medications, past family history, past medical history, past social history, past surgical history and problem list     Review of Systems   Constitutional: Negative for appetite change, chills, fatigue, fever and unexpected weight change  HENT: Negative for hearing loss, rhinorrhea, sneezing, sore throat, trouble swallowing and voice change  Eyes: Negative for visual disturbance  Respiratory: Positive for cough, chest tightness and shortness of breath (Et 2 blocks)  Negative for wheezing  Cardiovascular: Negative for chest pain, palpitations and leg swelling  Gastrointestinal: Negative for abdominal pain, constipation, diarrhea, nausea and vomiting  Candidal esophagitis, barrets on recent endoscopy   Genitourinary: Negative for dysuria, frequency and urgency  Musculoskeletal: Positive for arthralgias and back pain  Negative for gait problem  Skin: Negative for rash  Allergic/Immunologic: Negative for environmental allergies  Neurological: Negative for dizziness, syncope, light-headedness and headaches  Psychiatric/Behavioral: Negative for agitation, confusion and sleep disturbance  The patient is not nervous/anxious  Objective:      /70 (BP Location: Right arm, Patient Position: Sitting, Cuff Size: Standard)   Pulse 69   Temp 97 7 °F (36 5 °C)   Ht 5' 10" (1 778 m)   Wt 81 2 kg (179 lb)   SpO2 97%   BMI 25 68 kg/m²          Physical Exam  Constitutional:       General: He is not in acute distress  Appearance: He is not ill-appearing, toxic-appearing or diaphoretic  HENT:      Head: Normocephalic  Mouth/Throat:      Mouth: Mucous membranes are moist    Eyes:      General: No scleral icterus  Conjunctiva/sclera: Conjunctivae normal    Cardiovascular:      Rate and Rhythm: Normal rate and regular rhythm  Heart sounds: Normal heart sounds  No murmur heard    Pulmonary:      Effort: Pulmonary effort is normal  No respiratory distress  Breath sounds: No stridor  Rales (bilateral basal inspiratory crackles) present  No wheezing or rhonchi  Chest:      Chest wall: No tenderness  Abdominal:      General: Bowel sounds are normal       Palpations: Abdomen is soft  Tenderness: There is no abdominal tenderness  There is no guarding  Musculoskeletal:      Cervical back: No rigidity  Right lower leg: No edema  Left lower leg: No edema  Lymphadenopathy:      Cervical: No cervical adenopathy  Skin:     Coloration: Skin is not jaundiced or pale  Neurological:      Mental Status: He is alert and oriented to person, place, and time  Gait: Gait normal    Psychiatric:         Mood and Affect: Mood normal          Behavior: Behavior normal          Thought Content: Thought content normal          Judgment: Judgment normal        I spent 30 minutes of time taking care of this patient with complex pulmonary issues  The majority of this time was spent directly with the patient counseling the/coordinating care  98

## 2023-02-25 NOTE — ED ADULT NURSE NOTE - NSIMPLEMENTINTERV_GEN_ALL_ED
Implemented All Fall Risk Interventions:  [place] to call system. Call bell, personal items and telephone within reach. Instruct patient to call for assistance. Room bathroom lighting operational. Non-slip footwear when patient is off stretcher. Physically safe environment: no spills, clutter or unnecessary equipment. Stretcher in lowest position, wheels locked, appropriate side rails in place. Provide visual cue, wrist band, yellow gown, etc. Monitor gait and stability. Monitor for mental status changes and reorient to person, place, and time. Review medications for side effects contributing to fall risk. Reinforce activity limits and safety measures with patient and family.

## 2023-02-25 NOTE — H&P ADULT - ASSESSMENT
68F with recently diagnosed Takotsubo cardiomyopathy, gastritis admitted for surgical fixation of left ankle fracture.

## 2023-02-25 NOTE — H&P ADULT - NSHPPHYSICALEXAM_GEN_ALL_CORE
Vital Signs Last 24 Hrs  T(C): 36.2 (25 Feb 2023 09:46), Max: 36.7 (25 Feb 2023 03:47)  T(F): 97.2 (25 Feb 2023 09:46), Max: 98.1 (25 Feb 2023 05:40)  HR: 75 (25 Feb 2023 09:46) (72 - 91)  BP: 88/55 (25 Feb 2023 09:46) (84/57 - 95/59)  BP(mean): 70 (25 Feb 2023 05:40) (70 - 70)  RR: 17 (25 Feb 2023 09:46) (16 - 20)  SpO2: 97% (25 Feb 2023 09:46) (97% - 100%)    Parameters below as of 25 Feb 2023 09:46  Patient On (Oxygen Delivery Method): room air    GENERAL: NAD, well-developed  HEAD:  NCAT  EYES: EOMI, PERRL, conjunctiva and sclera clear  NECK: Supple, No JVD  CHEST/LUNG: Clear to auscultation bilaterally; No wheeze  HEART: Regular rate and rhythm; No murmurs, rubs, or gallops  ABDOMEN: Soft, Nontender, Nondistended; Bowel sounds present  EXTREMITIES:  2+ Peripheral Pulses. left ankle in splint.   PSYCH: AAOx3, appropriate affect  NEUROLOGY: non-focal, fairchild  SKIN: No rashes or lesions

## 2023-02-25 NOTE — H&P ADULT - NSHPLABSRESULTS_GEN_ALL_CORE
12.3   11.62 )-----------( 258      ( 2023 04:24 )             38.0     02-25    138  |  100  |  14  ----------------------------<  116<H>  4.1   |  27  |  0.59    Ca    9.1      2023 04:24    TPro  6.8  /  Alb  4.1  /  TBili  0.2  /  DBili  x   /  AST  13  /  ALT  15  /  AlkPhos  97  02-25    PT/INR - ( 2023 05:09 )   PT: 12.3 sec;   INR: 1.07 ratio         PTT - ( 2023 05:09 )  PTT:27.9 sec        Urinalysis Basic - ( 2023 06:39 )    Color: Light Yellow / Appearance: Clear / S.014 / pH: x  Gluc: x / Ketone: Negative  / Bili: Negative / Urobili: Negative   Blood: x / Protein: Trace / Nitrite: Negative   Leuk Esterase: Negative / RBC: 2 /hpf / WBC 1 /HPF   Sq Epi: x / Non Sq Epi: 2 /hpf / Bacteria: Negative          CAPILLARY BLOOD GLUCOSE      EKG personally interpreted: NSR, HR 69, QTc 432, no ischemic changes

## 2023-02-25 NOTE — ED PROVIDER NOTE - OBJECTIVE STATEMENT
68-year-old female with past medical history of Takotsubo cardiomyopathy, GERD, here for dizziness and subsequent fall with left ankle injury occurring just before presentation to the emergency department.  She states that she had gotten up to go into the kitchen and as she was returning to her bedroom she felt dizziness not associated with any chest pain or shortness of breath and fell.  She immediately noticed a deformity to her left ankle and felt that she could "move the bones easily."  She currently denies any fevers, chills, dysuria, hematuria, abdominal pain.  She has not been able to ambulate since the injury.

## 2023-02-25 NOTE — CONSULT NOTE ADULT - SUBJECTIVE AND OBJECTIVE BOX
ortho will see HPI  68yFemale c/o L ankle pain s/p syncopal fall earlier this AM. Unable to bear weight in the LLE since the fall. Denies headstrike or LOC. Denies numbness/tingling in the LLE. Denies any other trauma/injuries at this time. At baseline, community ambulator w/o assistive devices.    ROS  Negative unless otherwise specified in HPI.    PAST MEDICAL & SURGICAL Hx  PAST MEDICAL & SURGICAL HISTORY:  Takotsubo cardiomyopathy      GERD (gastroesophageal reflux disease)      No significant past surgical history      MEDICATIONS  Home Medications:  acetaminophen 325 mg oral tablet: 2 tab(s) orally every 6 hours, As needed, Temp greater or equal to 38C (100.4F), Mild Pain (1 - 3) (03 Nov 2022 12:33)  Caltrate 600 + D oral tablet: 1 tab(s) orally 2 times a day (31 Oct 2022 19:15)  magnesium oxide: 1 tab(s) orally once a day (31 Oct 2022 19:15)  Pepcid 20 mg oral tablet: 1 tab(s) orally 2 times a day (31 Oct 2022 19:15)  Vitamin B12 1000 mcg oral tablet: 1 tab(s) orally once a day (31 Oct 2022 19:15)  Zinc 140 mg (as elemental zinc 50 mg) oral tablet: 1 tab(s) orally once a day (31 Oct 2022 19:15)      ALLERGIES  penicillin (Rash)        VITALS  Vital Signs Last 24 Hrs  T(C): 36.7 (25 Feb 2023 05:40), Max: 36.7 (25 Feb 2023 03:47)  T(F): 98.1 (25 Feb 2023 05:40), Max: 98.1 (25 Feb 2023 05:40)  HR: 91 (25 Feb 2023 03:47) (91 - 91)  BP: 95/59 (25 Feb 2023 05:40) (92/62 - 95/59)  BP(mean): 70 (25 Feb 2023 05:40) (70 - 70)  RR: 20 (25 Feb 2023 05:40) (18 - 20)  SpO2: 98% (25 Feb 2023 05:40) (98% - 98%)  Parameters below as of 25 Feb 2023 05:40  Patient On (Oxygen Delivery Method): room air      PHYSICAL EXAM  Gen: Lying in bed, NAD  Resp: No increased WOB  LLE:  Skin intact, +edema and +ecchymosis over L ankle  +TTP over L ankle, no TTP along remainder of extremity; compartments soft  Limited ROM at ankle 2/2 pain  Motor: TA/EHL/GS/FHL intact  Sensory: DP/SP/Tib/Yves/Saph SILT  +DP pulse, WWP    Secondary survey:  No TTP along spine or other extremities, pelvis grossly stable, SILT and soft compartments throughout    LABS                        12.3   11.62 )-----------( 258      ( 25 Feb 2023 04:24 )             38.0     02-25    138  |  100  |  14  ----------------------------<  116<H>  4.1   |  27  |  0.59    Ca    9.1      25 Feb 2023 04:24    TPro  6.8  /  Alb  4.1  /  TBili  0.2  /  DBili  x   /  AST  13  /  ALT  15  /  AlkPhos  97  02-25    PT/INR - ( 25 Feb 2023 05:09 )   PT: 12.3 sec;   INR: 1.07 ratio    PTT - ( 25 Feb 2023 05:09 )  PTT:27.9 sec    IMAGING  XRs: L ankle fx (personal read)    PROCEDURE  Using aseptic technique, a hematoma block was administered to the fracture site using 10cc of 1% lidocaine without epinephrine. Closed reduction was subsequently performed and a well-padded, well-molded plaster splint applied. The patient tolerated the procedure well without evidence of complications. The patient was neurovascularly intact following reduction. Post-reduction XRs demonstrated acceptable alignment. The patient was informed about splint precautions (keep dry, do not stick anything inside, monitor for signs/symptoms of increased compartmental pressure: uncontrolled pain, worsening numbness/tingling, severe pain with movement of the fingers/toes) and verbalized understanding.    ASSESSMENT & PLAN  68yFemale w/ L ankle fx s/p closed reduction and immobilization.  -NWB LLE in a short-leg trilam splint  -splint precautions  -OR on 2/26/23  -f/u preop: CBC, BMP, coags, T&S x2, CXR, EKG, COVID  -NPO past midnight, IVF  -hold chemical DVT ppx for OR; SCDs OK  -please document medical clearance ASAP for OR  -pain control  -ice/cold compress, elevation LLE

## 2023-02-25 NOTE — H&P ADULT - NSICDXFAMILYHX_GEN_ALL_CORE_FT
FAMILY HISTORY:  Father  Still living? Unknown  FH: COPD (chronic obstructive pulmonary disease), Age at diagnosis: Age Unknown    Mother  Still living? Unknown  FH: breast cancer, Age at diagnosis: Age Unknown

## 2023-02-25 NOTE — ED PROVIDER NOTE - PHYSICAL EXAMINATION
General: well appearing, alert, oriented to person, time, place  Psych: mood appropriate  Head: normocephalic; atraumatic  Eyes: conjunctivae clear bilaterally, sclerae anicteric  ENT: no nasal flaring, patent nares  Cardio: skin warm and well perfused  Resp: normal respiratory effort  GI: soft/nondistended/nontender  Neuro: normal sensation, moving all four extremities equally  Skin: No evidence of rash or bruising  MSK: TTP over medial malleolus; clear deformity noted at medial malleolus; neurovascularly intact  Lymph/Vasc: no LE edema

## 2023-02-25 NOTE — H&P ADULT - NSICDXPASTMEDICALHX_GEN_ALL_CORE_FT
PAST MEDICAL HISTORY:  Gastritis     GERD (gastroesophageal reflux disease)     Takotsubo cardiomyopathy

## 2023-02-25 NOTE — H&P ADULT - PROBLEM SELECTOR PLAN 1
Acute left ankle fracture s/p reduction by ortho 2/25, planned for surgical fixation 2/26  - No medical contraindication to proceed with low risk surgery. She is not in heart failure exacerbation. No further workup indicated  - Pain control with tylenol

## 2023-02-26 ENCOUNTER — TRANSCRIPTION ENCOUNTER (OUTPATIENT)
Age: 68
End: 2023-02-26

## 2023-02-26 LAB
ANION GAP SERPL CALC-SCNC: 10 MMOL/L — SIGNIFICANT CHANGE UP (ref 5–17)
APTT BLD: 30.6 SEC — SIGNIFICANT CHANGE UP (ref 27.5–35.5)
BLD GP AB SCN SERPL QL: NEGATIVE — SIGNIFICANT CHANGE UP
BUN SERPL-MCNC: 10 MG/DL — SIGNIFICANT CHANGE UP (ref 7–23)
CALCIUM SERPL-MCNC: 8.9 MG/DL — SIGNIFICANT CHANGE UP (ref 8.4–10.5)
CHLORIDE SERPL-SCNC: 104 MMOL/L — SIGNIFICANT CHANGE UP (ref 96–108)
CO2 SERPL-SCNC: 24 MMOL/L — SIGNIFICANT CHANGE UP (ref 22–31)
CREAT SERPL-MCNC: 0.49 MG/DL — LOW (ref 0.5–1.3)
CULTURE RESULTS: SIGNIFICANT CHANGE UP
EGFR: 104 ML/MIN/1.73M2 — SIGNIFICANT CHANGE UP
GLUCOSE SERPL-MCNC: 101 MG/DL — HIGH (ref 70–99)
HCT VFR BLD CALC: 34.5 % — SIGNIFICANT CHANGE UP (ref 34.5–45)
HGB BLD-MCNC: 11 G/DL — LOW (ref 11.5–15.5)
INR BLD: 1.14 RATIO — SIGNIFICANT CHANGE UP (ref 0.88–1.16)
MAGNESIUM SERPL-MCNC: 2 MG/DL — SIGNIFICANT CHANGE UP (ref 1.6–2.6)
MCHC RBC-ENTMCNC: 28.7 PG — SIGNIFICANT CHANGE UP (ref 27–34)
MCHC RBC-ENTMCNC: 31.9 GM/DL — LOW (ref 32–36)
MCV RBC AUTO: 90.1 FL — SIGNIFICANT CHANGE UP (ref 80–100)
NRBC # BLD: 0 /100 WBCS — SIGNIFICANT CHANGE UP (ref 0–0)
PHOSPHATE SERPL-MCNC: 3 MG/DL — SIGNIFICANT CHANGE UP (ref 2.5–4.5)
PLATELET # BLD AUTO: 209 K/UL — SIGNIFICANT CHANGE UP (ref 150–400)
POTASSIUM SERPL-MCNC: 3.9 MMOL/L — SIGNIFICANT CHANGE UP (ref 3.5–5.3)
POTASSIUM SERPL-SCNC: 3.9 MMOL/L — SIGNIFICANT CHANGE UP (ref 3.5–5.3)
PROTHROM AB SERPL-ACNC: 13.3 SEC — SIGNIFICANT CHANGE UP (ref 10.5–13.4)
RBC # BLD: 3.83 M/UL — SIGNIFICANT CHANGE UP (ref 3.8–5.2)
RBC # FLD: 12.8 % — SIGNIFICANT CHANGE UP (ref 10.3–14.5)
RH IG SCN BLD-IMP: POSITIVE — SIGNIFICANT CHANGE UP
SODIUM SERPL-SCNC: 138 MMOL/L — SIGNIFICANT CHANGE UP (ref 135–145)
SPECIMEN SOURCE: SIGNIFICANT CHANGE UP
WBC # BLD: 6.82 K/UL — SIGNIFICANT CHANGE UP (ref 3.8–10.5)
WBC # FLD AUTO: 6.82 K/UL — SIGNIFICANT CHANGE UP (ref 3.8–10.5)

## 2023-02-26 PROCEDURE — 99232 SBSQ HOSP IP/OBS MODERATE 35: CPT

## 2023-02-26 DEVICE — SCREW 3.5X14MM: Type: IMPLANTABLE DEVICE | Site: LEFT | Status: FUNCTIONAL

## 2023-02-26 DEVICE — SCREW FT 3.5X12MM: Type: IMPLANTABLE DEVICE | Site: LEFT | Status: FUNCTIONAL

## 2023-02-26 DEVICE — IMPLANTABLE DEVICE: Type: IMPLANTABLE DEVICE | Site: LEFT | Status: FUNCTIONAL

## 2023-02-26 DEVICE — WASHER T8/T10 2.4X2.7X3.5MM: Type: IMPLANTABLE DEVICE | Site: LEFT | Status: FUNCTIONAL

## 2023-02-26 DEVICE — SCREW 2.7X16MM: Type: IMPLANTABLE DEVICE | Site: LEFT | Status: FUNCTIONAL

## 2023-02-26 RX ORDER — ACETAMINOPHEN 500 MG
650 TABLET ORAL EVERY 6 HOURS
Refills: 0 | Status: DISCONTINUED | OUTPATIENT
Start: 2023-02-26 | End: 2023-02-28

## 2023-02-26 RX ORDER — SENNA PLUS 8.6 MG/1
2 TABLET ORAL AT BEDTIME
Refills: 0 | Status: DISCONTINUED | OUTPATIENT
Start: 2023-02-26 | End: 2023-02-28

## 2023-02-26 RX ORDER — OXYCODONE HYDROCHLORIDE 5 MG/1
10 TABLET ORAL EVERY 4 HOURS
Refills: 0 | Status: DISCONTINUED | OUTPATIENT
Start: 2023-02-26 | End: 2023-02-28

## 2023-02-26 RX ORDER — TRAZODONE HCL 50 MG
50 TABLET ORAL AT BEDTIME
Refills: 0 | Status: DISCONTINUED | OUTPATIENT
Start: 2023-02-26 | End: 2023-02-28

## 2023-02-26 RX ORDER — ASPIRIN/CALCIUM CARB/MAGNESIUM 324 MG
325 TABLET ORAL
Refills: 0 | Status: DISCONTINUED | OUTPATIENT
Start: 2023-02-26 | End: 2023-02-28

## 2023-02-26 RX ORDER — MORPHINE SULFATE 50 MG/1
2 CAPSULE, EXTENDED RELEASE ORAL EVERY 4 HOURS
Refills: 0 | Status: DISCONTINUED | OUTPATIENT
Start: 2023-02-26 | End: 2023-02-28

## 2023-02-26 RX ORDER — SUCRALFATE 1 G
1 TABLET ORAL
Refills: 0 | Status: DISCONTINUED | OUTPATIENT
Start: 2023-02-26 | End: 2023-02-28

## 2023-02-26 RX ORDER — OXYCODONE HYDROCHLORIDE 5 MG/1
5 TABLET ORAL EVERY 4 HOURS
Refills: 0 | Status: DISCONTINUED | OUTPATIENT
Start: 2023-02-26 | End: 2023-02-28

## 2023-02-26 RX ORDER — LANOLIN ALCOHOL/MO/W.PET/CERES
3 CREAM (GRAM) TOPICAL AT BEDTIME
Refills: 0 | Status: DISCONTINUED | OUTPATIENT
Start: 2023-02-26 | End: 2023-02-28

## 2023-02-26 RX ORDER — HYDROMORPHONE HYDROCHLORIDE 2 MG/ML
0.25 INJECTION INTRAMUSCULAR; INTRAVENOUS; SUBCUTANEOUS
Refills: 0 | Status: DISCONTINUED | OUTPATIENT
Start: 2023-02-26 | End: 2023-02-26

## 2023-02-26 RX ORDER — ONDANSETRON 8 MG/1
4 TABLET, FILM COATED ORAL ONCE
Refills: 0 | Status: DISCONTINUED | OUTPATIENT
Start: 2023-02-26 | End: 2023-02-26

## 2023-02-26 RX ORDER — METOPROLOL TARTRATE 50 MG
25 TABLET ORAL DAILY
Refills: 0 | Status: DISCONTINUED | OUTPATIENT
Start: 2023-02-26 | End: 2023-02-28

## 2023-02-26 RX ORDER — ONDANSETRON 8 MG/1
4 TABLET, FILM COATED ORAL EVERY 8 HOURS
Refills: 0 | Status: DISCONTINUED | OUTPATIENT
Start: 2023-02-26 | End: 2023-02-28

## 2023-02-26 RX ORDER — MORPHINE SULFATE 50 MG/1
2 CAPSULE, EXTENDED RELEASE ORAL EVERY 4 HOURS
Refills: 0 | Status: DISCONTINUED | OUTPATIENT
Start: 2023-02-26 | End: 2023-02-26

## 2023-02-26 RX ORDER — PANTOPRAZOLE SODIUM 20 MG/1
40 TABLET, DELAYED RELEASE ORAL
Refills: 0 | Status: DISCONTINUED | OUTPATIENT
Start: 2023-02-26 | End: 2023-02-28

## 2023-02-26 RX ADMIN — MORPHINE SULFATE 1 MILLIGRAM(S): 50 CAPSULE, EXTENDED RELEASE ORAL at 05:15

## 2023-02-26 RX ADMIN — Medication 50 MILLIGRAM(S): at 21:34

## 2023-02-26 RX ADMIN — Medication 1 GRAM(S): at 00:26

## 2023-02-26 RX ADMIN — OXYCODONE HYDROCHLORIDE 10 MILLIGRAM(S): 5 TABLET ORAL at 18:26

## 2023-02-26 RX ADMIN — PANTOPRAZOLE SODIUM 40 MILLIGRAM(S): 20 TABLET, DELAYED RELEASE ORAL at 05:00

## 2023-02-26 RX ADMIN — Medication 1 GRAM(S): at 17:00

## 2023-02-26 RX ADMIN — OXYCODONE HYDROCHLORIDE 10 MILLIGRAM(S): 5 TABLET ORAL at 19:26

## 2023-02-26 RX ADMIN — Medication 1 GRAM(S): at 23:03

## 2023-02-26 RX ADMIN — Medication 325 MILLIGRAM(S): at 17:00

## 2023-02-26 RX ADMIN — PANTOPRAZOLE SODIUM 40 MILLIGRAM(S): 20 TABLET, DELAYED RELEASE ORAL at 17:00

## 2023-02-26 RX ADMIN — Medication 1 GRAM(S): at 12:05

## 2023-02-26 RX ADMIN — OXYCODONE HYDROCHLORIDE 10 MILLIGRAM(S): 5 TABLET ORAL at 14:28

## 2023-02-26 RX ADMIN — Medication 1 GRAM(S): at 05:00

## 2023-02-26 RX ADMIN — OXYCODONE HYDROCHLORIDE 10 MILLIGRAM(S): 5 TABLET ORAL at 15:28

## 2023-02-26 RX ADMIN — MORPHINE SULFATE 1 MILLIGRAM(S): 50 CAPSULE, EXTENDED RELEASE ORAL at 04:58

## 2023-02-26 NOTE — BRIEF OPERATIVE NOTE - NSICDXBRIEFPROCEDURE_GEN_ALL_CORE_FT
PROCEDURES:  Open reduction and internal fixation (ORIF) of bimalleolar fracture 26-Feb-2023 10:20:01 Luciano Wing

## 2023-02-27 LAB
ANION GAP SERPL CALC-SCNC: 12 MMOL/L — SIGNIFICANT CHANGE UP (ref 5–17)
BASOPHILS # BLD AUTO: 0.02 K/UL — SIGNIFICANT CHANGE UP (ref 0–0.2)
BASOPHILS NFR BLD AUTO: 0.2 % — SIGNIFICANT CHANGE UP (ref 0–2)
BUN SERPL-MCNC: 8 MG/DL — SIGNIFICANT CHANGE UP (ref 7–23)
CALCIUM SERPL-MCNC: 9.1 MG/DL — SIGNIFICANT CHANGE UP (ref 8.4–10.5)
CHLORIDE SERPL-SCNC: 99 MMOL/L — SIGNIFICANT CHANGE UP (ref 96–108)
CO2 SERPL-SCNC: 24 MMOL/L — SIGNIFICANT CHANGE UP (ref 22–31)
CREAT SERPL-MCNC: 0.49 MG/DL — LOW (ref 0.5–1.3)
EGFR: 104 ML/MIN/1.73M2 — SIGNIFICANT CHANGE UP
EOSINOPHIL # BLD AUTO: 0.05 K/UL — SIGNIFICANT CHANGE UP (ref 0–0.5)
EOSINOPHIL NFR BLD AUTO: 0.5 % — SIGNIFICANT CHANGE UP (ref 0–6)
GLUCOSE SERPL-MCNC: 129 MG/DL — HIGH (ref 70–99)
HCT VFR BLD CALC: 31.2 % — LOW (ref 34.5–45)
HGB BLD-MCNC: 9.9 G/DL — LOW (ref 11.5–15.5)
IMM GRANULOCYTES NFR BLD AUTO: 0.4 % — SIGNIFICANT CHANGE UP (ref 0–0.9)
LYMPHOCYTES # BLD AUTO: 1.23 K/UL — SIGNIFICANT CHANGE UP (ref 1–3.3)
LYMPHOCYTES # BLD AUTO: 11.7 % — LOW (ref 13–44)
MCHC RBC-ENTMCNC: 29.1 PG — SIGNIFICANT CHANGE UP (ref 27–34)
MCHC RBC-ENTMCNC: 31.7 GM/DL — LOW (ref 32–36)
MCV RBC AUTO: 91.8 FL — SIGNIFICANT CHANGE UP (ref 80–100)
MONOCYTES # BLD AUTO: 0.66 K/UL — SIGNIFICANT CHANGE UP (ref 0–0.9)
MONOCYTES NFR BLD AUTO: 6.3 % — SIGNIFICANT CHANGE UP (ref 2–14)
NEUTROPHILS # BLD AUTO: 8.47 K/UL — HIGH (ref 1.8–7.4)
NEUTROPHILS NFR BLD AUTO: 80.9 % — HIGH (ref 43–77)
NRBC # BLD: 0 /100 WBCS — SIGNIFICANT CHANGE UP (ref 0–0)
PLATELET # BLD AUTO: 231 K/UL — SIGNIFICANT CHANGE UP (ref 150–400)
POTASSIUM SERPL-MCNC: 4 MMOL/L — SIGNIFICANT CHANGE UP (ref 3.5–5.3)
POTASSIUM SERPL-SCNC: 4 MMOL/L — SIGNIFICANT CHANGE UP (ref 3.5–5.3)
RBC # BLD: 3.4 M/UL — LOW (ref 3.8–5.2)
RBC # FLD: 12.7 % — SIGNIFICANT CHANGE UP (ref 10.3–14.5)
SODIUM SERPL-SCNC: 135 MMOL/L — SIGNIFICANT CHANGE UP (ref 135–145)
WBC # BLD: 10.47 K/UL — SIGNIFICANT CHANGE UP (ref 3.8–10.5)
WBC # FLD AUTO: 10.47 K/UL — SIGNIFICANT CHANGE UP (ref 3.8–10.5)

## 2023-02-27 PROCEDURE — 99232 SBSQ HOSP IP/OBS MODERATE 35: CPT

## 2023-02-27 RX ADMIN — OXYCODONE HYDROCHLORIDE 10 MILLIGRAM(S): 5 TABLET ORAL at 02:00

## 2023-02-27 RX ADMIN — Medication 650 MILLIGRAM(S): at 16:12

## 2023-02-27 RX ADMIN — Medication 650 MILLIGRAM(S): at 14:02

## 2023-02-27 RX ADMIN — Medication 325 MILLIGRAM(S): at 17:15

## 2023-02-27 RX ADMIN — OXYCODONE HYDROCHLORIDE 5 MILLIGRAM(S): 5 TABLET ORAL at 04:42

## 2023-02-27 RX ADMIN — Medication 1 GRAM(S): at 06:11

## 2023-02-27 RX ADMIN — PANTOPRAZOLE SODIUM 40 MILLIGRAM(S): 20 TABLET, DELAYED RELEASE ORAL at 17:17

## 2023-02-27 RX ADMIN — Medication 325 MILLIGRAM(S): at 06:11

## 2023-02-27 RX ADMIN — PANTOPRAZOLE SODIUM 40 MILLIGRAM(S): 20 TABLET, DELAYED RELEASE ORAL at 06:11

## 2023-02-27 RX ADMIN — Medication 1 GRAM(S): at 17:16

## 2023-02-27 RX ADMIN — OXYCODONE HYDROCHLORIDE 10 MILLIGRAM(S): 5 TABLET ORAL at 01:00

## 2023-02-27 RX ADMIN — OXYCODONE HYDROCHLORIDE 5 MILLIGRAM(S): 5 TABLET ORAL at 05:42

## 2023-02-27 RX ADMIN — OXYCODONE HYDROCHLORIDE 10 MILLIGRAM(S): 5 TABLET ORAL at 16:17

## 2023-02-27 RX ADMIN — OXYCODONE HYDROCHLORIDE 10 MILLIGRAM(S): 5 TABLET ORAL at 18:33

## 2023-02-27 RX ADMIN — Medication 50 MILLIGRAM(S): at 21:11

## 2023-02-27 RX ADMIN — OXYCODONE HYDROCHLORIDE 10 MILLIGRAM(S): 5 TABLET ORAL at 12:32

## 2023-02-27 RX ADMIN — OXYCODONE HYDROCHLORIDE 10 MILLIGRAM(S): 5 TABLET ORAL at 08:32

## 2023-02-27 RX ADMIN — Medication 1 GRAM(S): at 11:36

## 2023-02-27 RX ADMIN — SENNA PLUS 2 TABLET(S): 8.6 TABLET ORAL at 21:11

## 2023-02-27 NOTE — PROGRESS NOTE ADULT - ASSESSMENT
68F with recently diagnosed Takotsubo cardiomyopathy, gastritis admitted for surgical fixation of left ankle fracture. 
68F with recently diagnosed Takotsubo cardiomyopathy, gastritis admitted for surgical fixation of left ankle fracture.

## 2023-02-27 NOTE — PHYSICAL THERAPY INITIAL EVALUATION ADULT - PERTINENT HX OF CURRENT PROBLEM, REHAB EVAL
68F with recently diagnosed Takotsubo cardiomyopathy, gastritis admitted for surgical fixation of left ankle fracture. now s/p above procedure, CT Lt ankle 2/25, acute trimalleolar fx, XR Lt ankle/tib/fib/foot 2/25, trimalleolar fx, CXR 2/25, hazy tetracrotic opacity.

## 2023-02-27 NOTE — PHYSICAL THERAPY INITIAL EVALUATION ADULT - ADDITIONAL COMMENTS
as per pt, resides in a co-op alone, +ramp to enter, will stay in sister's house upon DC (same housing set-up), PTA, pt amb (I), (I) with ADls.

## 2023-02-27 NOTE — PROGRESS NOTE ADULT - PROBLEM SELECTOR PLAN 5
DVT:  BID   Diet: as tolerated  Dispo: Pending PT
DVT:  BID for 4 weeks,  Diet: as tolerated  Dispo: pending DME equipment delivery at home

## 2023-02-27 NOTE — PHYSICAL THERAPY INITIAL EVALUATION ADULT - ACTIVE RANGE OF MOTION EXAMINATION, REHAB EVAL
except Lt ankle n/t d/t s/p OR/bilateral upper extremity Active ROM was WFL (within functional limits)/bilateral  lower extremity Active ROM was WFL (within functional limits)

## 2023-02-27 NOTE — PHYSICAL THERAPY INITIAL EVALUATION ADULT - NSPTDMEREC_GEN_A_CORE
ambulate with rolling walker due to decreased balance and increased risk of falling/rolling walker/bathing/toileting/wheelchair

## 2023-02-27 NOTE — OCCUPATIONAL THERAPY INITIAL EVALUATION ADULT - PERTINENT HX OF CURRENT PROBLEM, REHAB EVAL
68F with recently diagnosed Takotsubo cardiomyopathy, gastritis felt under the weather this morning. She was walking from the kitchen to her bedroom when she felt dizzy and tripped over a rug. She did not experience any chest pain or shortness of breath. She denies head trauma or LOC. She felt a lot of pain in her ankle and noted it was deformed and has not been able to stand/ambulate. Of note she was diagnosed with Takotsubo cardiomyopathy in 11/2022 and gastritis on EGD 01/2023. She has excellent functional status and lives alone and completes all her own ADLs. Pt found to have L ankle trimalleolar fracture. In the ED, pt hemodynamically stable with borderline low blood pressures (has a history). Ortho reduced her fracture, plans for surgical fixation 2/26. Pt now s/p L ankle ORIF on 2/26.

## 2023-02-27 NOTE — PROGRESS NOTE ADULT - PROBLEM SELECTOR PLAN 2
Provoked by dizziness, now resolved  - orthostatics neg  -PT eval- home PT
Provoked by dizziness, now resolved  -f/u orthostatics when able to stand   -PT richard

## 2023-02-27 NOTE — PHYSICAL THERAPY INITIAL EVALUATION ADULT - IMPAIRMENTS FOUND, PT EVAL
aerobic capacity/endurance/gait, locomotion, and balance/muscle strength Winlevi Pregnancy And Lactation Text: This medication is considered safe during pregnancy and breastfeeding.

## 2023-02-27 NOTE — CHART NOTE - NSCHARTNOTEFT_GEN_A_CORE
Due to acute ankle fracture s/p ORIF (S89.898W), patient has a severe mobility limitation and requires a standard wheelchair, with elevated leg rest to assist in daily ADL's. Patient cannot ambulate safely with a cane or a walker. Patient has a caregiver to assist with maneuvering the wheelchair. Pt has not expressed an unwillingness to use the wheelchair. Patient has ample room in the home for said wheelchair. Use of a  wheelchair will significantly improve the patients ability to participate in daily ADL's and the patient will use it on a regular basis in the home.   She will also need a rolling walker for when she is better able to start ambulating.  KANE Hunter NP
Resting without complaints.  No Chest Pain, SOB, N/V.    T(C): 36.6 (02-26-23 @ 12:39), Max: 37.6 (02-25-23 @ 16:42)  HR: 88 (02-26-23 @ 12:39) (77 - 107)  BP: 96/62 (02-26-23 @ 12:39) (96/62 - 118/72)  RR: 18 (02-26-23 @ 12:39) (16 - 18)  SpO2: 93% (02-26-23 @ 12:39) (91% - 98%)      Exam:  Alert and [place], No Acute Distress  Card: +S1/S2, RRR  Pulm: CTAB  Laterality: L Ankle  Short Leg Cast  Calves soft, non-tender bilaterally  +PF/DF/EHL/FHL  SILT  + DP pulse    Xray: Intra-op Fluoroscopy: S/P L Ankle Tri-Malleolar Fx ORIF.  hardware in place and intact with no signs of new Fx                          11.0   6.82  )-----------( 209      ( 26 Feb 2023 05:54 )             34.5    02-26    138  |  104  |  10  ----------------------------<  101<H>  3.9   |  24  |  0.49<L>    Ca    8.9      26 Feb 2023 05:58  Phos  3.0     02-26  Mg     2.0     02-26    TPro  6.8  /  Alb  4.1  /  TBili  0.2  /  DBili  x   /  AST  13  /  ALT  15  /  AlkPhos  97  02-25      A/P: 68y Female S/p L Ankle Tri-Malleolar Fx ORIF. VSS. NAD  -PT/OT-NWB LLE  -FU AM labs  -IS  -DVT PPx: ASA 325mg PO BID  -Pain Control  -Continue Current Tx as per primary team (Medicine)  -Dispo planning pending PT recommendations    Ant Bonner PA-C  Orthopedic Surgery Team  Team Pager #[phone]

## 2023-02-27 NOTE — PROGRESS NOTE ADULT - PROBLEM SELECTOR PLAN 4
Hx Takotsubo w/ normal EF 11/2022, echo this admission with resolved WMA  -c/w toprol   -outpatient f/u with cards.
Hx Takotsubo w/ normal EF 11/2022, echo this admission with resolved WMA  -c/w toprol   -outpatient f/u with cards

## 2023-02-28 ENCOUNTER — TRANSCRIPTION ENCOUNTER (OUTPATIENT)
Age: 68
End: 2023-02-28

## 2023-02-28 VITALS
HEART RATE: 82 BPM | OXYGEN SATURATION: 99 % | TEMPERATURE: 98 F | RESPIRATION RATE: 18 BRPM | DIASTOLIC BLOOD PRESSURE: 69 MMHG | SYSTOLIC BLOOD PRESSURE: 107 MMHG

## 2023-02-28 LAB
ANION GAP SERPL CALC-SCNC: 12 MMOL/L — SIGNIFICANT CHANGE UP (ref 5–17)
BUN SERPL-MCNC: 11 MG/DL — SIGNIFICANT CHANGE UP (ref 7–23)
CALCIUM SERPL-MCNC: 9.2 MG/DL — SIGNIFICANT CHANGE UP (ref 8.4–10.5)
CHLORIDE SERPL-SCNC: 100 MMOL/L — SIGNIFICANT CHANGE UP (ref 96–108)
CO2 SERPL-SCNC: 26 MMOL/L — SIGNIFICANT CHANGE UP (ref 22–31)
CREAT SERPL-MCNC: 0.59 MG/DL — SIGNIFICANT CHANGE UP (ref 0.5–1.3)
EGFR: 99 ML/MIN/1.73M2 — SIGNIFICANT CHANGE UP
GLUCOSE SERPL-MCNC: 84 MG/DL — SIGNIFICANT CHANGE UP (ref 70–99)
POTASSIUM SERPL-MCNC: 4 MMOL/L — SIGNIFICANT CHANGE UP (ref 3.5–5.3)
POTASSIUM SERPL-SCNC: 4 MMOL/L — SIGNIFICANT CHANGE UP (ref 3.5–5.3)
SODIUM SERPL-SCNC: 138 MMOL/L — SIGNIFICANT CHANGE UP (ref 135–145)

## 2023-02-28 PROCEDURE — 73630 X-RAY EXAM OF FOOT: CPT

## 2023-02-28 PROCEDURE — 85025 COMPLETE CBC W/AUTO DIFF WBC: CPT

## 2023-02-28 PROCEDURE — 76377 3D RENDER W/INTRP POSTPROCES: CPT

## 2023-02-28 PROCEDURE — 73590 X-RAY EXAM OF LOWER LEG: CPT

## 2023-02-28 PROCEDURE — C9399: CPT

## 2023-02-28 PROCEDURE — 36415 COLL VENOUS BLD VENIPUNCTURE: CPT

## 2023-02-28 PROCEDURE — 86901 BLOOD TYPING SEROLOGIC RH(D): CPT

## 2023-02-28 PROCEDURE — 81001 URINALYSIS AUTO W/SCOPE: CPT

## 2023-02-28 PROCEDURE — 87086 URINE CULTURE/COLONY COUNT: CPT

## 2023-02-28 PROCEDURE — 84100 ASSAY OF PHOSPHORUS: CPT

## 2023-02-28 PROCEDURE — 96374 THER/PROPH/DIAG INJ IV PUSH: CPT

## 2023-02-28 PROCEDURE — C1713: CPT

## 2023-02-28 PROCEDURE — 86900 BLOOD TYPING SEROLOGIC ABO: CPT

## 2023-02-28 PROCEDURE — 97162 PT EVAL MOD COMPLEX 30 MIN: CPT

## 2023-02-28 PROCEDURE — 85610 PROTHROMBIN TIME: CPT

## 2023-02-28 PROCEDURE — 85027 COMPLETE CBC AUTOMATED: CPT

## 2023-02-28 PROCEDURE — 73700 CT LOWER EXTREMITY W/O DYE: CPT | Mod: MA

## 2023-02-28 PROCEDURE — 83735 ASSAY OF MAGNESIUM: CPT

## 2023-02-28 PROCEDURE — 85730 THROMBOPLASTIN TIME PARTIAL: CPT

## 2023-02-28 PROCEDURE — 80053 COMPREHEN METABOLIC PANEL: CPT

## 2023-02-28 PROCEDURE — 97165 OT EVAL LOW COMPLEX 30 MIN: CPT

## 2023-02-28 PROCEDURE — 73610 X-RAY EXAM OF ANKLE: CPT

## 2023-02-28 PROCEDURE — 99285 EMERGENCY DEPT VISIT HI MDM: CPT

## 2023-02-28 PROCEDURE — 84484 ASSAY OF TROPONIN QUANT: CPT

## 2023-02-28 PROCEDURE — 71045 X-RAY EXAM CHEST 1 VIEW: CPT

## 2023-02-28 PROCEDURE — 99239 HOSP IP/OBS DSCHRG MGMT >30: CPT

## 2023-02-28 PROCEDURE — 76376 3D RENDER W/INTRP POSTPROCES: CPT

## 2023-02-28 PROCEDURE — 96375 TX/PRO/DX INJ NEW DRUG ADDON: CPT

## 2023-02-28 PROCEDURE — 87637 SARSCOV2&INF A&B&RSV AMP PRB: CPT

## 2023-02-28 PROCEDURE — 86850 RBC ANTIBODY SCREEN: CPT

## 2023-02-28 PROCEDURE — 76000 FLUOROSCOPY <1 HR PHYS/QHP: CPT

## 2023-02-28 PROCEDURE — 93306 TTE W/DOPPLER COMPLETE: CPT

## 2023-02-28 PROCEDURE — 80048 BASIC METABOLIC PNL TOTAL CA: CPT

## 2023-02-28 RX ORDER — OXYCODONE HYDROCHLORIDE 5 MG/1
1 TABLET ORAL
Qty: 20 | Refills: 0
Start: 2023-02-28 | End: 2023-03-04

## 2023-02-28 RX ORDER — OXYCODONE HYDROCHLORIDE 5 MG/1
1 TABLET ORAL
Qty: 0 | Refills: 0 | DISCHARGE
Start: 2023-02-28

## 2023-02-28 RX ORDER — ASPIRIN/CALCIUM CARB/MAGNESIUM 324 MG
1 TABLET ORAL
Qty: 0 | Refills: 0 | DISCHARGE
Start: 2023-02-28

## 2023-02-28 RX ADMIN — Medication 1 GRAM(S): at 11:28

## 2023-02-28 RX ADMIN — PANTOPRAZOLE SODIUM 40 MILLIGRAM(S): 20 TABLET, DELAYED RELEASE ORAL at 07:07

## 2023-02-28 RX ADMIN — OXYCODONE HYDROCHLORIDE 5 MILLIGRAM(S): 5 TABLET ORAL at 02:11

## 2023-02-28 RX ADMIN — OXYCODONE HYDROCHLORIDE 5 MILLIGRAM(S): 5 TABLET ORAL at 01:11

## 2023-02-28 RX ADMIN — OXYCODONE HYDROCHLORIDE 5 MILLIGRAM(S): 5 TABLET ORAL at 10:09

## 2023-02-28 RX ADMIN — Medication 1 GRAM(S): at 07:06

## 2023-02-28 RX ADMIN — Medication 1 GRAM(S): at 01:11

## 2023-02-28 RX ADMIN — Medication 325 MILLIGRAM(S): at 07:06

## 2023-02-28 NOTE — DISCHARGE NOTE NURSING/CASE MANAGEMENT/SOCIAL WORK - NSDCPEFALRISK_GEN_ALL_CORE
For information on Fall & Injury Prevention, visit: [email or web address] OR  [email or web address] OR  https://www.cdc.gov/steadi/patient.html
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
## 2023-02-28 NOTE — DISCHARGE NOTE PROVIDER - NSDCFUADDAPPT_GEN_ALL_CORE_FT
APPTS ARE READY TO BE MADE: [x ] YES    Best Family or Patient Contact (if needed):    Additional Information about above appointments (if needed):    1: Dr. Fournier 10-14 days  2:   3:     Other comments or requests:

## 2023-02-28 NOTE — DISCHARGE NOTE PROVIDER - NSDCCPCAREPLAN_GEN_ALL_CORE_FT
PRINCIPAL DISCHARGE DIAGNOSIS  Diagnosis: Bimalleolar fracture of left ankle  Assessment and Plan of Treatment: non weight bearing on the left lower extremity  out patient physical therapy  aspirin for DVT prophylaxis until seen by Dr. Fournier

## 2023-02-28 NOTE — PROGRESS NOTE ADULT - SUBJECTIVE AND OBJECTIVE BOX
Patient seen and examined at bedside.  No acute complaints at this time and patient wishes to go home today. Patient states pain has improved since surgery, and is well controlled. Denies chest pain, shortness of breath, nausea or vomiting. Denies new numbness/tingling in the LLE.     PE:  Vital Signs Last 24 Hrs  T(C): 36.8 (28 Feb 2023 01:02), Max: 37.2 (27 Feb 2023 17:06)  T(F): 98.2 (28 Feb 2023 01:02), Max: 98.9 (27 Feb 2023 17:06)  HR: 79 (28 Feb 2023 01:02) (69 - 82)  BP: 102/63 (28 Feb 2023 01:02) (90/56 - 107/60)  BP(mean): --  RR: 18 (28 Feb 2023 01:02) (17 - 18)  SpO2: 99% (28 Feb 2023 01:02) (95% - 99%)    Parameters below as of 28 Feb 2023 01:02  Patient On (Oxygen Delivery Method): room air          LABS:                        9.9    10.47 )-----------( 231      ( 27 Feb 2023 07:42 )             31.2     27 Feb 2023 07:42    135    |  99     |  8      ----------------------------<  129    4.0     |  24     |  0.49     Ca    9.1        27 Feb 2023 07:42      PT/INR - ( 26 Feb 2023 05:58 )   PT: 13.3 sec;   INR: 1.14 ratio         PTT - ( 26 Feb 2023 05:58 )  PTT:30.6 secGeneral: NAD, resting comfortably in bed  LLE:   Short leg cast in place, intact  Compartments soft and compressible  No calf tenderness bilaterally  unable to be completely assessed 2/2 cast placement, able to wiggle all toes  Sensation intact all toes  Good capillary refill in toes <3 seconds, WWP                   A/P:  68y f s/p w/ L Trimal ankle fx s/p ORIF 2/26      -NWB on the LLE  -Pain Control  -Rest, ice, compress and elevate the extremity as we needed  -PT/OT  -DVT ppx, aspirin until directed by Dr Fournier  -Incentive Spirometry  -Medical management appreciated  -Dipso: home
Patient seen and examined at bedside.  No acute complaints at this time. Pain well controlled. Denies chest pain, shortness of breath, nausea or vomiting. Denies new numbness/tingling in the LLE.     PE:  Vital Signs Last 24 Hrs  T(C): 36.9 (02-25-23 @ 22:02), Max: 37.6 (02-25-23 @ 16:42)  T(F): 98.5 (02-25-23 @ 22:02), Max: 99.6 (02-25-23 @ 16:42)  HR: 80 (02-25-23 @ 22:02) (72 - 91)  BP: 101/66 (02-25-23 @ 22:02) (84/57 - 118/72)  BP(mean): 87 (02-25-23 @ 16:42) (70 - 87)  RR: 18 (02-25-23 @ 22:02) (16 - 20)  SpO2: 96% (02-25-23 @ 22:02) (96% - 100%)    General: NAD, resting comfortably in bed  LLE:   AO Splint  Splint windowed, wrinkles med/L  Compartments soft and compressible  No calf tenderness bilaterally  +TA/EHL/FHL/GSC unable to be completely assessed 2/2 splint placement  Wiggling toes  Sensation intact all toes  + DP pulse  Good capillary refill in toes <3 seconds                            12.3   11.62 )-----------( 258      ( 25 Feb 2023 04:24 )             38.0     02-25    138  |  100  |  14  ----------------------------<  116<H>  4.1   |  27  |  0.59    Ca    9.1      25 Feb 2023 04:24    TPro  6.8  /  Alb  4.1  /  TBili  0.2  /  DBili  x   /  AST  13  /  ALT  15  /  AlkPhos  97  02-25    PT/INR - ( 25 Feb 2023 05:09 )   PT: 12.3 sec;   INR: 1.07 ratio         PTT - ( 25 Feb 2023 05:09 )  PTT:27.9 sec    A/P:  68y f s/p w/ L Trimal ankle fx    -Plan for OR 2/26 AM w/ Dr. Fournier for ORIF  -NPO after midnight for OR 2/26  -IVF while NPO  -Hold chemical vte ppx for OR 2/26  -Preop labs  -PT/OT   -NWB on the LLE  -Pain Control  -Rest, ice, compress and elevate the extremity as we needed  -Please ramp leg w/ numerous blankets/pillows so the leg is above heart  -Big bag of ice over leg w/ ice  -Incentive Spirometry  -Medical management appreciated  -Please continue to document medical optimization for OR  -Dipso: likely home after sx 
Patient seen and examined at bedside.  No acute complaints at this time. Patient states pain has improved since surgery, and is well controlled. Denies chest pain, shortness of breath, nausea or vomiting. Denies new numbness/tingling in the LLE. Tolerated surgery well    PE:  Vital Signs Last 24 Hrs  T(C): 36.6 (27 Feb 2023 05:12), Max: 36.9 (26 Feb 2023 13:45)  T(F): 97.9 (27 Feb 2023 05:12), Max: 98.4 (26 Feb 2023 13:45)  HR: 77 (27 Feb 2023 05:12) (77 - 107)  BP: 99/60 (27 Feb 2023 05:12) (94/56 - 115/67)  BP(mean): 77 (26 Feb 2023 11:15) (64 - 78)  RR: 17 (27 Feb 2023 05:12) (16 - 18)  SpO2: 97% (27 Feb 2023 05:12) (91% - 98%)    Parameters below as of 27 Feb 2023 05:12  Patient On (Oxygen Delivery Method): room air        General: NAD, resting comfortably in bed  LLE:   Short leg cast in place, intact  Compartments soft and compressible  No calf tenderness bilaterally  unable to be completely assessed 2/2 cast placement, able to wiggle all toes  Sensation intact all toes  Good capillary refill in toes <3 seconds, WWP                   A/P:  68y f s/p w/ L Trimal ankle fx s/p ORIF 2/26      -NWB on the LLE  -Pain Control  -Rest, ice, compress and elevate the extremity as we needed  -PT/OT  -DVT ppx, aspirin until directed by Dr Fournier  -Incentive Spirometry  -Medical management appreciated  -Dipso: home
[facility] Division of Hospital Medicine  Isaak Buchanan MD  Available via MS Teams      SUBJECTIVE / OVERNIGHT EVENTS:  No acute events overnight. Patient requesting to go home.     ADDITIONAL REVIEW OF SYSTEMS:  negative, as above  MEDICATIONS  (STANDING):  aspirin 325 milliGRAM(s) Oral two times a day  lidocaine 1% Injectable 20 milliLiter(s) Local Injection Once  metoprolol succinate ER 25 milliGRAM(s) Oral daily  pantoprazole    Tablet 40 milliGRAM(s) Oral two times a day  senna 2 Tablet(s) Oral at bedtime  sucralfate 1 Gram(s) Oral four times a day  traZODone 50 milliGRAM(s) Oral at bedtime    MEDICATIONS  (PRN):  acetaminophen     Tablet .. 650 milliGRAM(s) Oral every 6 hours PRN Mild Pain (1 - 3)  melatonin 3 milliGRAM(s) Oral at bedtime PRN Insomnia  morphine  - Injectable 2 milliGRAM(s) IV Push every 4 hours PRN Severe Pain (7 - 10)  ondansetron Injectable 4 milliGRAM(s) IV Push every 8 hours PRN Nausea and/or Vomiting  oxyCODONE    IR 10 milliGRAM(s) Oral every 4 hours PRN Severe Pain (7 - 10)  oxyCODONE    IR 5 milliGRAM(s) Oral every 4 hours PRN Moderate Pain (4 - 6)      I&O's Summary    26 Feb 2023 07:01  -  27 Feb 2023 07:00  --------------------------------------------------------  IN: 850 mL / OUT: 2925 mL / NET: -2075 mL    27 Feb 2023 07:01  -  27 Feb 2023 16:01  --------------------------------------------------------  IN: 480 mL / OUT: 1400 mL / NET: -920 mL        PHYSICAL EXAM:  Vital Signs Last 24 Hrs  T(C): 36.6 (27 Feb 2023 14:51), Max: 36.6 (26 Feb 2023 17:52)  T(F): 97.8 (27 Feb 2023 14:51), Max: 97.9 (27 Feb 2023 05:12)  HR: 73 (27 Feb 2023 14:51) (69 - 103)  BP: 100/67 (27 Feb 2023 14:51) (96/65 - 107/60)  BP(mean): --  RR: 18 (27 Feb 2023 14:51) (16 - 18)  SpO2: 99% (27 Feb 2023 14:51) (96% - 99%)    Parameters below as of 27 Feb 2023 14:51  Patient On (Oxygen Delivery Method): room air      CONSTITUTIONAL: NAD, well-developed, well-groomed  EYES: PERRLA; conjunctiva and sclera clear  ENMT: Moist oral mucosa, no pharyngeal injection or exudates; normal dentition  NECK: Supple, no palpable masses; no thyromegaly  RESPIRATORY: Normal respiratory effort; lungs are clear to auscultation bilaterally  CARDIOVASCULAR: Regular rate and rhythm, normal S1 and S2, no murmur/rub/gallop; No lower extremity edema; Peripheral pulses are 2+ bilaterally  ABDOMEN: Nontender to palpation, normoactive bowel sounds, no rebound/guarding; No hepatosplenomegaly  MUSCULOSKELETAL:  Normal gait; no clubbing or cyanosis of digits; no joint swelling or tenderness to palpation. LLE wrapped.   PSYCH: A+O to person, place, and time; affect appropriate  NEUROLOGY: CN 2-12 are intact and symmetric; no gross sensory deficits   SKIN: No rashes; no palpable lesions    LABS:                        9.9    10.47 )-----------( 231      ( 27 Feb 2023 07:42 )             31.2     02-27    135  |  99  |  8   ----------------------------<  129<H>  4.0   |  24  |  0.49<L>    Ca    9.1      27 Feb 2023 07:42  Phos  3.0     02-26  Mg     2.0     02-26      PT/INR - ( 26 Feb 2023 05:58 )   PT: 13.3 sec;   INR: 1.14 ratio         PTT - ( 26 Feb 2023 05:58 )  PTT:30.6 sec          Culture - Urine (collected 25 Feb 2023 06:39)  Source: Clean Catch Clean Catch (Midstream)  Final Report (26 Feb 2023 07:37):    <10,000 CFU/mL Normal Urogenital Faith          RADIOLOGY & ADDITIONAL TESTS:  ekg, labs, micro imaging personally reviewed      COORDINATION OF CARE:  care discussed and coordinated with consultants.    
[facility] Division of Hospital Medicine  Cong Leonard MD  Available via MS Teams      SUBJECTIVE / OVERNIGHT EVENTS: Patient seen and examined, s/p left ankle fracture fixation.     ADDITIONAL REVIEW OF SYSTEMS:       CONSTITUTIONAL: No fevers or chills  EYES:  No visual changes or eye pain  ENMT: No hearing loss or sore throat  RESPIRATORY: No ocugh, No wheezing, hemoptysis; No shortness of breath  CARDIOVASCULAR: No chest pain or palpitations  GASTROINTESTINAL: No abdominal pain; No nausea, vomiting, or hematemesis; No diarrhea; No melena or hematochezia  GENITOURINARY: No dysuria or hematuria  MUSCULOSKELETAL: +foot pain, No joint pain or swelling; No back pain  NEUROLOGICAL: No headache; No numbness or loss of sensation; No loss of strength in extremities  PSYCHIATRIC: No anxiety or depression  SKIN:  No burning or lesions     MEDICATIONS  (STANDING):  aspirin 325 milliGRAM(s) Oral two times a day  lidocaine 1% Injectable 20 milliLiter(s) Local Injection Once  metoprolol succinate ER 25 milliGRAM(s) Oral daily  pantoprazole    Tablet 40 milliGRAM(s) Oral two times a day  senna 2 Tablet(s) Oral at bedtime  sucralfate 1 Gram(s) Oral four times a day  traZODone 50 milliGRAM(s) Oral at bedtime    MEDICATIONS  (PRN):  acetaminophen     Tablet .. 650 milliGRAM(s) Oral every 6 hours PRN Mild Pain (1 - 3)  melatonin 3 milliGRAM(s) Oral at bedtime PRN Insomnia  morphine IVPB 2 milliGRAM(s) IV Intermittent every 4 hours PRN Severe Pain (7 - 10)  ondansetron Injectable 4 milliGRAM(s) IV Push every 8 hours PRN Nausea and/or Vomiting  oxyCODONE    IR 10 milliGRAM(s) Oral every 4 hours PRN Severe Pain (7 - 10)  oxyCODONE    IR 5 milliGRAM(s) Oral every 4 hours PRN Moderate Pain (4 - 6)      I&O's Summary    2023 07:01  -  2023 13:15  --------------------------------------------------------  IN: 0 mL / OUT: 800 mL / NET: -800 mL        PHYSICAL EXAM:  Vital Signs Last 24 Hrs  T(C): 36.6 (2023 12:39), Max: 37.6 (2023 16:42)  T(F): 97.8 (2023 12:39), Max: 99.6 (2023 16:42)  HR: 88 (2023 12:39) (77 - 107)  BP: 96/62 (2023 12:39) (96/62 - 118/72)  BP(mean): 77 (2023 11:15) (64 - 87)  RR: 18 (2023 12:39) (16 - 18)  SpO2: 93% (2023 12:39) (91% - 98%)    Parameters below as of 2023 12:39  Patient On (Oxygen Delivery Method): room air      CONSTITUTIONAL: NAD, well-developed, well-groomed  EYES: PERRLA; conjunctiva and sclera clear  ENMT: Moist oral mucosa, no pharyngeal injection or exudates; normal dentition  NECK: Supple, no palpable masses; no thyromegaly  RESPIRATORY: Normal respiratory effort; lungs are clear to auscultation bilaterally  CARDIOVASCULAR: Regular rate and rhythm, normal S1 and S2, no murmur/rub/gallop; No lower extremity edema; Peripheral pulses are 2+ bilaterally  ABDOMEN: Nontender to palpation, normoactive bowel sounds, no rebound/guarding; No hepatosplenomegaly  MUSCULOSKELETAL:  Normal gait; no clubbing or cyanosis of digits; no joint swelling or tenderness to palpation. LLE wrapped.   PSYCH: A+O to person, place, and time; affect appropriate  NEUROLOGY: CN 2-12 are intact and symmetric; no gross sensory deficits   SKIN: No rashes; no palpable lesions    LABS:                        11.0   6.82  )-----------( 209      ( 2023 05:54 )             34.5         138  |  104  |  10  ----------------------------<  101<H>  3.9   |  24  |  0.49<L>    Ca    8.9      2023 05:58  Phos  3.0       Mg     2.0         TPro  6.8  /  Alb  4.1  /  TBili  0.2  /  DBili  x   /  AST  13  /  ALT  15  /  AlkPhos  97      PT/INR - ( 2023 05:58 )   PT: 13.3 sec;   INR: 1.14 ratio         PTT - ( 2023 05:58 )  PTT:30.6 sec      Urinalysis Basic - ( 2023 06:39 )    Color: Light Yellow / Appearance: Clear / S.014 / pH: x  Gluc: x / Ketone: Negative  / Bili: Negative / Urobili: Negative   Blood: x / Protein: Trace / Nitrite: Negative   Leuk Esterase: Negative / RBC: 2 /hpf / WBC 1 /HPF   Sq Epi: x / Non Sq Epi: 2 /hpf / Bacteria: Negative        Culture - Urine (collected 2023 06:39)  Source: Clean Catch Clean Catch (Midstream)  Final Report (2023 07:37):    <10,000 CFU/mL Normal Urogenital Faith          RADIOLOGY & ADDITIONAL TESTS:  CT Ankle - reviewed by me

## 2023-02-28 NOTE — DISCHARGE NOTE PROVIDER - CARE PROVIDER_API CALL
Ramirez Fournier (MD)  Orthopaedic Surgery  [address], [address]  [place] [address]  Phone: [phone]  Fax: [phone]  Follow Up Time: 2 weeks    Cuca Dean ()  Family Medicine  [address] [place] [address]  Phone: [phone]  Fax: [phone]  Follow Up Time:

## 2023-02-28 NOTE — DISCHARGE NOTE NURSING/CASE MANAGEMENT/SOCIAL WORK - PATIENT PORTAL LINK FT
You can access the FollowMyHealth Patient Portal offered by [facility] by registering at the following website: [email or web address]. By joining [employer]’s FollowMyHealth portal, you will also be able to view your health information using other applications (apps) compatible with our system.

## 2023-02-28 NOTE — PROGRESS NOTE ADULT - REASON FOR ADMISSION
left ankle fracture, plan for surgical fixation

## 2023-02-28 NOTE — DISCHARGE NOTE PROVIDER - HOSPITAL COURSE
88yMale presenting with severe anemia presenting with rest pain of LLE. Severe plaque burden of LLE on angiogram, unable to cross with wire. Now s/p L CFA to below knee pop bypass w PTFE graft. Monophasic L AT postoperatively. Will require bedrest postop.     PLAN:    - strict bedrest  -oral pain control as needed  -continue Plavix  -Q4hr vascular exams  -Cardiac monitor postoperatively   68F with recently diagnosed Takotsubo cardiomyopathy, gastritis felt under the weather this morning. She was walking from the kitchen to her bedroom when she felt dizzy and tripped over a rug. She did not experience any chest pain or shortness of breath. She denies head trauma or LOC. She felt a lot of pain in her ankle and noted it was deformed and has not been able to stand/ambulate.     Of note she was diagnosed with Takotsubo cardiomyopathy in 11/2022 and gastritis on EGD 01/2023. She has excellent functional status and lives alone and completes all her own ADLs.     IN the ED, pt hemodynamically stable with borderline low blood pressures (has a history). Ortho reduced her fracture, plans for surgical fixation 2/26.   ·  Problem: Ankle fracture, left.   ·  Plan: s/p fixation by ortho 2/26  -Pain control PRN with Tylenol/Morphine PRN.     Problem/Plan - 2:  ·  Problem: Fall.   ·  Plan: Provoked by dizziness, now resolved  - orthostatics neg  -PT eval- home PT.     Problem/Plan - 3:  ·  Problem: Gastritis.   ·  Plan: -c/w IV PPI BID, sulcralfate 1g QID.     Problem/Plan - 4:  ·  Problem: Takotsubo cardiomyopathy.   ·  Plan: Hx Takotsubo w/ normal EF 11/2022, echo this admission with resolved WMA  -c/w toprol   -outpatient f/u with cards.     Problem/Plan - 5:  ·  Problem: Prophylactic measure.   ·  Plan: DVT:  BID for 4 weeks,  Diet: as tolerated  Dispo: pending DME equipment delivery at home.

## 2023-04-03 NOTE — PATIENT PROFILE ADULT - ARE SIGNIFICANT INDICATORS COMPLETE.
Progress Note    Date: 04/03/2023  Time In: 10:40am   Time Out:11:35an     Patient Legal Name: Zoya Crook  Patient Age: 56 y.o.    CHIEF COMPLAINT: Depression *  Subjective   History of Present Illness         Zoya is a 56 y.o. female who presents today as a follow-up for continued psychotherapy. Our most recent session was on 02/23/23. At that time our goals focused on **   Continuing to set firm limits in family relationships. Practice self care, keeping appointments and following up with providers.     Assessment    Mental Status Exam     Appearance: good hygiene and dressed appropriately for the weather  Behavior: calm  Cooperation:  engaged, cooperative, attentive, and friendly  Eye Contact:  good  Affect:  congruent and sad  Mood: expressive, depressed and irritable  Speech: responsive  Thought Process:  linear  Thought Content: appropriate  Suicidal: denies  Homicidal:  denies  Hallucinations:  denies  Memory:  intact  Orientation:  person, place, time, and situation  Reliability:  reliable  Insight:  good  Judgment:  good     Clinical Intervention       ICD-10-CM ICD-9-CM   1. Anxiety and depression  F41.9 300.00    F32.A 311        Individual psychotherapy was provided utilizing solution focused  techniques to promote problem-solving, provide symptom relief, encourage expression of thoughts and feelings, support self-esteem, discuss values and strengths, manage stress, identify triggers and recognize patterns of behavior. Today Zoya reports that she is sleeping well. She has complaints about weight gain (reports 20lbs since January), irritability, loss of pleasure or interest in doing things, and accompanied sadness. She has anxious moments but depressive symptoms seem to be primary. She takes her prn anxiety medication daily.  She has numerous medical appointments upcoming. She has sat limits with her adult children and at this time things are going well with that. She has a new grandson and has advised  Yes her daughter that she will not  Be her childcare provider.  She discussed events of her past, still troubled by thoughts of the war and her childhood.     Plan   Plan & Goals     Pay attention to negative thoughts.   Cognitive restructuring to focus on accomplishments, strengths.  Discuss concerns with  [facility] ARNP regarding ongoing depressive symptoms at next appointment.     1. Patient acknowledged and verbally consented to continue working toward resolving current treatment plan goals and was educated on the importance of participation in the therapeutic process.  2. Patient will remain compliant with medication regimen as prescribed. Discuss any medication side effects, questions or concerns with prescribing provider.  3. Call 911 or present to the nearest emergency room in an emergency situation.  4. National Suicide Prevention Lifeline: Call 988. The Lifeline provides 24/7, free and confidential support for people in distress, prevention and crisis resources.    No follow-ups on file.    ____________________  This document has been electronically signed by Trudi Almeida LCSW  April 3, 2023 12:56 EDT    Part of this note may be an electronic transcription/translation of spoken language to printed text using the Dragon Dictation System.    Baby Born 2/24  brandi  For now she in trailor   I cant baby sit  I feel too bad    back problem, neurourgeon soon    Sleeping better,, gained weight, 20 lbs since January  l  Thyroid, swallow issues????   Too man people, anxious around people    Noticing more Irritable - cant take noise   People talking  make me tired, have to get away from it   Lack ability to do things, cleaning   Laid  Around,   I used to love it, cleaning, order,   Takes hydroxazine in morning,   Sleep is good but difficulty with depression     Read off symptoms of depression- she is very symptomatic ?

## 2023-10-27 NOTE — OCCUPATIONAL THERAPY INITIAL EVALUATION ADULT - LEVEL OF INDEPENDENCE: EATING, OT EVAL
Subjective:     Luz Maria Chicas is a 7 y.o. female here with mother. Patient brought in for Vomiting, Diarrhea, and Fever      History of Present Illness:  Emesis  Associated symptoms include coughing, a fever and vomiting. Pertinent negatives include no rash or sore throat.   Diarrhea  Associated symptoms include coughing, a fever and vomiting. Pertinent negatives include no rash or sore throat.   Fever  Associated symptoms include coughing, a fever and vomiting. Pertinent negatives include no rash or sore throat.     Mother provides history today.  Symptoms started 3 days ago with vomiting and diarrhea the day after attending a [employer] festival.  She has also had fever to 100-101F.  Was seen last week with viral URI and has lingering cough.  Last episode of vomiting was yesterday. Has been able to keep solids and liquids down today, but is having to wear diapers due to diarrhea when coughing.  No blood noted to stool. No known sick contacts.     Review of Systems   Constitutional:  Positive for fever.   HENT:  Negative for sore throat.    Respiratory:  Positive for cough.    Gastrointestinal:  Positive for diarrhea and vomiting. Negative for blood in stool.   Skin:  Negative for rash.       Objective:     Physical Exam  Constitutional:       General: She is not in acute distress.  HENT:      Head: Normocephalic and atraumatic.      Right Ear: Tympanic membrane and ear canal normal.      Left Ear: Tympanic membrane and ear canal normal.      Nose: No congestion or rhinorrhea.      Mouth/Throat:      Mouth: Mucous membranes are moist.      Pharynx: Oropharynx is clear. No oropharyngeal exudate or posterior oropharyngeal erythema.   Eyes:      General:         Right eye: No discharge.         Left eye: No discharge.   Cardiovascular:      Rate and Rhythm: Normal rate and regular rhythm.      Heart sounds: No murmur heard.     No friction rub. No gallop.   Pulmonary:      Effort: Pulmonary effort is normal. No  retractions.      Breath sounds: No wheezing, rhonchi or rales.   Abdominal:      General: Abdomen is flat.      Palpations: Abdomen is soft. There is no mass.      Tenderness: There is no guarding or rebound.   Musculoskeletal:      Cervical back: Normal range of motion and neck supple.   Lymphadenopathy:      Cervical: No cervical adenopathy.   Skin:     General: Skin is warm and dry.   Neurological:      Mental Status: She is alert.         Assessment:     1. Viral gastroenteritis        Plan:     Discussed likely viral cause of symptoms.  Reviewed natural course of illness including diarrhea for up to 7-10 days.  Return precautions of persistent fever past 3-4 days, blood in stool, or signs of dehydration discussed.   Supportive care reviewed.     Halina Thompson MD     independent

## 2023-11-09 RX ORDER — PANTOPRAZOLE SODIUM 20 MG/1
1 TABLET, DELAYED RELEASE ORAL
Qty: 0 | Refills: 0 | DISCHARGE

## 2023-11-09 RX ORDER — SENNA PLUS 8.6 MG/1
1 TABLET ORAL
Qty: 0 | Refills: 0 | DISCHARGE

## 2023-11-09 RX ORDER — TRAZODONE HCL 50 MG
1 TABLET ORAL
Qty: 0 | Refills: 0 | DISCHARGE

## 2023-11-09 RX ORDER — ZINC SULFATE TAB 220 MG (50 MG ZINC EQUIVALENT) 220 (50 ZN) MG
1 TAB ORAL
Qty: 0 | Refills: 0 | DISCHARGE

## 2023-11-09 RX ORDER — SUCRALFATE 1 G
1 TABLET ORAL
Qty: 0 | Refills: 0 | DISCHARGE

## 2023-11-09 RX ORDER — FLUTICASONE PROPIONATE 50 MCG
1 SPRAY, SUSPENSION NASAL
Qty: 0 | Refills: 0 | DISCHARGE

## 2023-11-09 RX ORDER — PREGABALIN 225 MG/1
1 CAPSULE ORAL
Qty: 0 | Refills: 0 | DISCHARGE

## 2023-11-09 RX ORDER — METOPROLOL TARTRATE 50 MG
1 TABLET ORAL
Qty: 0 | Refills: 0 | DISCHARGE

## 2023-11-09 RX ORDER — FAMOTIDINE 10 MG/ML
1 INJECTION INTRAVENOUS
Qty: 0 | Refills: 0 | DISCHARGE

## 2023-11-09 RX ORDER — MAGNESIUM OXIDE 400 MG ORAL TABLET 241.3 MG
1 TABLET ORAL
Qty: 0 | Refills: 0 | DISCHARGE

## 2023-11-09 RX ORDER — ASPIRIN/CALCIUM CARB/MAGNESIUM 324 MG
1 TABLET ORAL
Qty: 0 | Refills: 0 | DISCHARGE

## 2023-11-09 RX ORDER — CALCIUM CARBONATE 500(1250)
1 TABLET ORAL
Qty: 0 | Refills: 0 | DISCHARGE

## 2024-10-10 NOTE — OCCUPATIONAL THERAPY INITIAL EVALUATION ADULT - PERSONAL SAFETY AND JUDGMENT, REHAB EVAL
intact

## 2025-01-03 NOTE — DISCHARGE NOTE PROVIDER - REASON FOR ADMISSION
Colonoscopy Report    Lori Love     1962 Age 62 year old   PCP Sallie Thomas MD Endoscopist Christos Reis MD     Date of procedure: 25    Procedure: Colonoscopy w/biopsy and snare polypectomy    Pre-operative diagnosis: colorectal cancer screening, history of adenomatous colon polyps     Last underwent colonoscopy in 2019 with findings of 4 diminutive adenomatous colon polyps removed    Post-operative diagnosis: see impression    Sedation: monitored anesthesia care (MAC)    Consent: We discussed the risks/benefits and alternatives to this procedure, as well as the planned sedation. Informed consent was obtained from the patient after the risks of the procedure were discussed, including but not limited to bleeding, perforation, aspiration, infection, or possibility of a missed lesion as well as the risks of anesthesia including but not limited to cardiopulmonary complications. The patient signed informed consent and elected to proceed with Colonoscopy with intervention [i.e. Biopsy, control of bleeding, dilatation, polypectomy, endoscopic mucosal resection, etc.] as indicated.    Colonoscopy procedure: Once an adequate level of sedation was obtained a digital rectal exam was completed revealing normal tone and no masses palpated. Then the lubricated tip of the [id number] diagnostic video colonoscope was carefully inserted and advanced without difficulty to the cecum using the air insufflation technique (only Co2 was used for insufflation). The cecum was identified by localizing the trifold, the appendix and the ileocecal valve. Withdrawal was begun with thorough washing and careful examination of the colonic walls and folds. The ascending colon was viewed twice in the forward view. Photodocumentation was obtained. The bowel prep was good. Views of the colon were good with washing. Withdrawal time was 16 minutes.    Air was then withdrawn and the endoscope was removed. The patient  tolerated the procedure well. There were no immediate postoperative complications. The patient’s vital signs were monitored throughout the procedure and remained stable.    Estimated blood loss: insignificant    Specimens collected:  colon polyps    Complications: none     Colonoscopy findings:    Cecum: normal mucosa and vascular pattern    Ascending colon: normal mucosa and vascular pattern    Transverse colon: there was a 4-5 mm polyp removed by cold snare polypectomy. Otherwise, normal mucosa and vascular pattern    Descending colon: there was a 2-3 mm polyp removed by cold biopsy forceps. Otherwise, normal mucosa and vascular pattern    Sigmoid colon: normal mucosa and vascular pattern    Rectum: retroflexed view showed small non-bleeding hemorrhoids. Otherwise, normal mucosa and vascular pattern.    Impression:  1. Two diminutive colon polyps removed  2. Small hemorrhoids  3. Otherwise, unremarkable colonoscopy    Recommend:  1. Await pathology.   2. Repeat colonoscopy interval pending final pathology results. If new signs or symptoms develop, procedure may need to be repeated sooner.   3. Continue your current medications  4. Increase fiber in the diet  5. Follow up with your primary care physician on a routine basis    >>>If biopsies were performed and you have not received your pathology results either by phone or letter within 2 weeks, please call our office at [phone].    MD Isaak [facility] - Gastroenterology  1/3/2025           chest pain / NSTEMI

## 2025-05-27 ENCOUNTER — NON-APPOINTMENT (OUTPATIENT)
Age: 70
End: 2025-05-27

## (undated) DEVICE — DRILL BIT STRYKER 2X135MM

## (undated) DEVICE — ELCTR BOVIE PENCIL SMOKE EVACUATION

## (undated) DEVICE — DRAPE C ARM UNIVERSAL

## (undated) DEVICE — SOL IRR POUR H2O 250ML

## (undated) DEVICE — VENODYNE/SCD SLEEVE CALF LARGE

## (undated) DEVICE — DRAPE MAYO STAND 30"

## (undated) DEVICE — GLV 8 PROTEXIS (WHITE)

## (undated) DEVICE — SUT MONOSOF 3-0 18" C-14

## (undated) DEVICE — DRSG STOCKINETTE TUBULAR COTTON 1PLY 6X72"

## (undated) DEVICE — DRSG XEROFORM 1 X 8"

## (undated) DEVICE — DRSG CURITY GAUZE SPONGE 4 X 4" 12-PLY

## (undated) DEVICE — DRSG CAST FIBERGLASS 4"

## (undated) DEVICE — DRILL BIT STRYKER 2.6MM

## (undated) DEVICE — DRILL BIT STRYKER 3.5X122MM

## (undated) DEVICE — SOL IRR POUR NS 0.9% 500ML

## (undated) DEVICE — WARMING BLANKET UPPER ADULT

## (undated) DEVICE — BLADE SCALPEL SAFETYLOCK #15

## (undated) DEVICE — DRSG KLING 4"

## (undated) DEVICE — GLV 7.5 PROTEXIS (WHITE)

## (undated) DEVICE — POSITIONER FOAM EGG CRATE ULNAR 2PCS (PINK)

## (undated) DEVICE — POSITIONER CARDIAC BUMP

## (undated) DEVICE — GLV 8.5 PROTEXIS (WHITE)

## (undated) DEVICE — DRSG WEBRIL 4"

## (undated) DEVICE — DRSG ACE BANDAGE 4" NS

## (undated) DEVICE — DRILL BIT STRYKER 2.7MM

## (undated) DEVICE — SUT POLYSORB 2-0 30" GS-21 UNDYED

## (undated) DEVICE — DRAPE 3/4 SHEET W REINFORCEMENT 56X77"

## (undated) DEVICE — TOURNIQUET CUFF 34" DUAL PORT W PLC

## (undated) DEVICE — PACK EXTREMITY